# Patient Record
Sex: FEMALE | Race: BLACK OR AFRICAN AMERICAN | NOT HISPANIC OR LATINO | Employment: OTHER | URBAN - METROPOLITAN AREA
[De-identification: names, ages, dates, MRNs, and addresses within clinical notes are randomized per-mention and may not be internally consistent; named-entity substitution may affect disease eponyms.]

---

## 2017-01-04 PROBLEM — N63.0 BREAST LUMP: Status: ACTIVE | Noted: 2017-01-04

## 2017-01-18 ENCOUNTER — TELEPHONE (OUTPATIENT)
Dept: CARDIOLOGY | Facility: CLINIC | Age: 57
End: 2017-01-18

## 2017-01-18 NOTE — TELEPHONE ENCOUNTER
----- Message from Jillian Camarillo sent at 1/18/2017  1:49 PM CST -----  Contact: self  Call placed to pod.  Returning your call.  Please call back at

## 2017-01-18 NOTE — TELEPHONE ENCOUNTER
Attempted to call patient to inform her that her 2 u/s tests have to be done here as Dr Doty has to be able to read them.  No answer, unable to leave msg b/c mailbox was full.

## 2017-01-18 NOTE — TELEPHONE ENCOUNTER
----- Message from Alexandre Zayas sent at 1/17/2017  3:37 PM CST -----  Contact: Self  Requesting 2 tests scheduled at [facility] due to transportation. Wants the orders to be mailed to patient  and faxed to hospital because of complications last time. Please call [phone].Thank you!

## 2017-01-18 NOTE — TELEPHONE ENCOUNTER
Attempted to call patient 2nd time to inform u/s tests need to be done here in [place] and cannot be done at [place] as Dr Doty needs to read the tests and [place] is not an [facility] facility. No answer, unable to leave msg b/c mailbox was full

## 2017-02-20 ENCOUNTER — TELEPHONE (OUTPATIENT)
Dept: CARDIOLOGY | Facility: CLINIC | Age: 57
End: 2017-02-20

## 2017-02-20 NOTE — TELEPHONE ENCOUNTER
----- Message from RT Disha sent at 2/20/2017 10:27 AM CST -----  Contact: pt    Called pod, pt , returned missed call, thanks.

## 2017-02-20 NOTE — TELEPHONE ENCOUNTER
Returned pt's call and confirmed her scheduled testing, lab and Dr Lalitha hannon for her. Patient verbaized understanding.

## 2017-02-24 ENCOUNTER — LAB VISIT (OUTPATIENT)
Dept: LAB | Facility: HOSPITAL | Age: 57
End: 2017-02-24
Attending: INTERNAL MEDICINE
Payer: MEDICARE

## 2017-02-24 ENCOUNTER — CLINICAL SUPPORT (OUTPATIENT)
Dept: CARDIOLOGY | Facility: CLINIC | Age: 57
End: 2017-02-24
Payer: MEDICARE

## 2017-02-24 DIAGNOSIS — I71.40 ABDOMINAL AORTIC ANEURYSM WITHOUT RUPTURE: Chronic | ICD-10-CM

## 2017-02-24 DIAGNOSIS — I71.40 ABDOMINAL AORTIC ANEURYSM WITHOUT RUPTURE: ICD-10-CM

## 2017-02-24 DIAGNOSIS — Z95.5 STENTED CORONARY ARTERY: Chronic | ICD-10-CM

## 2017-02-24 DIAGNOSIS — I10 ESSENTIAL HYPERTENSION: ICD-10-CM

## 2017-02-24 DIAGNOSIS — I25.10 CORONARY ARTERY DISEASE INVOLVING NATIVE CORONARY ARTERY WITHOUT ANGINA PECTORIS, UNSPECIFIED WHETHER NATIVE OR TRANSPLANTED HEART: ICD-10-CM

## 2017-02-24 DIAGNOSIS — I71.40 ABDOMINAL AORTIC ANEURYSM (AAA) WITHOUT RUPTURE: ICD-10-CM

## 2017-02-24 DIAGNOSIS — I34.0 MITRAL REGURGITATION: ICD-10-CM

## 2017-02-24 DIAGNOSIS — I70.1 ATHEROSCLEROTIC RAS (RENAL ARTERY STENOSIS), BILATERAL: Chronic | ICD-10-CM

## 2017-02-24 DIAGNOSIS — I34.0 NONRHEUMATIC MITRAL VALVE INSUFFICIENCY: ICD-10-CM

## 2017-02-24 LAB
ALBUMIN SERPL BCP-MCNC: 3.5 G/DL
ALP SERPL-CCNC: 80 U/L
ALT SERPL W/O P-5'-P-CCNC: 11 U/L
ANION GAP SERPL CALC-SCNC: 8 MMOL/L
AST SERPL-CCNC: 14 U/L
BASOPHILS # BLD AUTO: 0.03 K/UL
BASOPHILS NFR BLD: 0.6 %
BILIRUB SERPL-MCNC: 0.4 MG/DL
BUN SERPL-MCNC: 25 MG/DL
CALCIUM SERPL-MCNC: 9.6 MG/DL
CHLORIDE SERPL-SCNC: 112 MMOL/L
CHOLEST/HDLC SERPL: 7.3 {RATIO}
CO2 SERPL-SCNC: 22 MMOL/L
CREAT SERPL-MCNC: 2 MG/DL
DIFFERENTIAL METHOD: ABNORMAL
EOSINOPHIL # BLD AUTO: 0.1 K/UL
EOSINOPHIL NFR BLD: 2.4 %
ERYTHROCYTE [DISTWIDTH] IN BLOOD BY AUTOMATED COUNT: 14.1 %
EST. GFR  (AFRICAN AMERICAN): 31.5 ML/MIN/1.73 M^2
EST. GFR  (NON AFRICAN AMERICAN): 27.3 ML/MIN/1.73 M^2
GLUCOSE SERPL-MCNC: 100 MG/DL
HCT VFR BLD AUTO: 43.4 %
HDL/CHOLESTEROL RATIO: 13.6 %
HDLC SERPL-MCNC: 257 MG/DL
HDLC SERPL-MCNC: 35 MG/DL
HGB BLD-MCNC: 13.7 G/DL
LDLC SERPL CALC-MCNC: 178.2 MG/DL
LYMPHOCYTES # BLD AUTO: 2 K/UL
LYMPHOCYTES NFR BLD: 36.6 %
MCH RBC QN AUTO: 28 PG
MCHC RBC AUTO-ENTMCNC: 31.6 %
MCV RBC AUTO: 89 FL
MONOCYTES # BLD AUTO: 0.5 K/UL
MONOCYTES NFR BLD: 9.9 %
NEUTROPHILS # BLD AUTO: 2.7 K/UL
NEUTROPHILS NFR BLD: 50.3 %
NONHDLC SERPL-MCNC: 222 MG/DL
PLATELET # BLD AUTO: 250 K/UL
PMV BLD AUTO: 12.9 FL
POTASSIUM SERPL-SCNC: 4.3 MMOL/L
PROT SERPL-MCNC: 7.7 G/DL
RBC # BLD AUTO: 4.89 M/UL
SODIUM SERPL-SCNC: 142 MMOL/L
TRIGL SERPL-MCNC: 219 MG/DL
WBC # BLD AUTO: 5.35 K/UL

## 2017-02-24 PROCEDURE — 36415 COLL VENOUS BLD VENIPUNCTURE: CPT | Mod: PO

## 2017-02-24 PROCEDURE — 93306 TTE W/DOPPLER COMPLETE: CPT | Mod: PBBFAC,PO | Performed by: INTERNAL MEDICINE

## 2017-02-24 PROCEDURE — 80061 LIPID PANEL: CPT

## 2017-02-24 PROCEDURE — 80053 COMPREHEN METABOLIC PANEL: CPT

## 2017-02-24 PROCEDURE — 85025 COMPLETE CBC W/AUTO DIFF WBC: CPT

## 2017-02-24 PROCEDURE — 93978 VASCULAR STUDY: CPT | Mod: PBBFAC,PO | Performed by: INTERNAL MEDICINE

## 2017-02-28 LAB
DIASTOLIC DYSFUNCTION: YES
ESTIMATED PA SYSTOLIC PRESSURE: 32
MITRAL VALVE REGURGITATION: ABNORMAL
RETIRED EF AND QEF - SEE NOTES: 55 (ref 55–65)
TRICUSPID VALVE REGURGITATION: ABNORMAL
VASCULAR ABDOMINAL AORTIC ANEURYSM (AAA): 2.44

## 2017-03-17 ENCOUNTER — TELEPHONE (OUTPATIENT)
Dept: CARDIOLOGY | Facility: CLINIC | Age: 57
End: 2017-03-17

## 2017-03-21 ENCOUNTER — OFFICE VISIT (OUTPATIENT)
Dept: CARDIOLOGY | Facility: CLINIC | Age: 57
End: 2017-03-21
Payer: MEDICARE

## 2017-03-21 VITALS
BODY MASS INDEX: 49.31 KG/M2 | HEART RATE: 71 BPM | HEIGHT: 64 IN | SYSTOLIC BLOOD PRESSURE: 170 MMHG | DIASTOLIC BLOOD PRESSURE: 103 MMHG | WEIGHT: 288.81 LBS

## 2017-03-21 DIAGNOSIS — I25.10 CORONARY ARTERY DISEASE DUE TO LIPID RICH PLAQUE: Chronic | ICD-10-CM

## 2017-03-21 DIAGNOSIS — I70.1 ATHEROSCLEROTIC RAS (RENAL ARTERY STENOSIS), BILATERAL: Primary | Chronic | ICD-10-CM

## 2017-03-21 DIAGNOSIS — I25.83 CORONARY ARTERY DISEASE DUE TO LIPID RICH PLAQUE: Chronic | ICD-10-CM

## 2017-03-21 DIAGNOSIS — I10 HTN (HYPERTENSION), MALIGNANT: Chronic | ICD-10-CM

## 2017-03-21 DIAGNOSIS — Z95.5 STENTED CORONARY ARTERY: Chronic | ICD-10-CM

## 2017-03-21 DIAGNOSIS — N18.3 CKD (CHRONIC KIDNEY DISEASE), STAGE 3 (MODERATE): Chronic | ICD-10-CM

## 2017-03-21 DIAGNOSIS — I71.30 RUPTURED ABDOMINAL AORTIC ANEURYSM (AAA): Chronic | ICD-10-CM

## 2017-03-21 DIAGNOSIS — E78.5 DYSLIPIDEMIA: Chronic | ICD-10-CM

## 2017-03-21 DIAGNOSIS — I34.0 NON-RHEUMATIC MITRAL REGURGITATION: Chronic | ICD-10-CM

## 2017-03-21 PROCEDURE — 99213 OFFICE O/P EST LOW 20 MIN: CPT | Mod: PBBFAC,PO | Performed by: INTERNAL MEDICINE

## 2017-03-21 PROCEDURE — 99214 OFFICE O/P EST MOD 30 MIN: CPT | Mod: S$PBB,,, | Performed by: INTERNAL MEDICINE

## 2017-03-21 PROCEDURE — 99999 PR PBB SHADOW E&M-EST. PATIENT-LVL III: CPT | Mod: PBBFAC,,, | Performed by: INTERNAL MEDICINE

## 2017-03-21 RX ORDER — CLOPIDOGREL BISULFATE 75 MG/1
75 TABLET ORAL DAILY
Qty: 90 TABLET | Refills: 5 | Status: SHIPPED | OUTPATIENT
Start: 2017-03-21 | End: 2017-08-04 | Stop reason: SDUPTHER

## 2017-03-21 RX ORDER — EZETIMIBE 10 MG/1
10 TABLET ORAL NIGHTLY
Qty: 90 TABLET | Refills: 5 | Status: SHIPPED | OUTPATIENT
Start: 2017-03-21 | End: 2017-10-23

## 2017-03-21 RX ORDER — AMLODIPINE BESYLATE 5 MG/1
5 TABLET ORAL NIGHTLY
Qty: 90 TABLET | Refills: 6 | Status: SHIPPED | OUTPATIENT
Start: 2017-03-21 | End: 2017-10-23

## 2017-03-21 RX ORDER — ROSUVASTATIN CALCIUM 10 MG/1
10 TABLET, COATED ORAL NIGHTLY
Qty: 90 TABLET | Refills: 4 | Status: SHIPPED | OUTPATIENT
Start: 2017-03-21 | End: 2017-08-04 | Stop reason: SDUPTHER

## 2017-03-21 RX ORDER — LISINOPRIL 20 MG/1
20 TABLET ORAL EVERY MORNING
Qty: 90 TABLET | Refills: 6 | Status: SHIPPED | OUTPATIENT
Start: 2017-03-21 | End: 2017-08-04 | Stop reason: SDUPTHER

## 2017-03-21 NOTE — PROGRESS NOTES
Subjective:    Patient ID:  Leena Gibbs is a 56 y.o. female who presents for follow-up of No chief complaint on file.      HPI   Ms. Gibbs here for follow up of resistant HTN/small AAA/MR.    Review of Systems   Constitution: Negative for chills, decreased appetite, weakness and night sweats.   HENT: Negative for headaches and nosebleeds.    Eyes: Negative for blurred vision and visual disturbance.   Cardiovascular: Negative for chest pain, claudication, cyanosis, dyspnea on exertion, irregular heartbeat, leg swelling, near-syncope, orthopnea, palpitations, paroxysmal nocturnal dyspnea and syncope.   Respiratory: Negative for cough and shortness of breath.    Endocrine: Negative for polyuria.   Hematologic/Lymphatic: Does not bruise/bleed easily.   Skin: Negative for flushing and rash.   Musculoskeletal: Negative for arthritis, joint pain, muscle cramps and myalgias.   Gastrointestinal: Negative for abdominal pain, change in bowel habit and heartburn.   Genitourinary: Negative for bladder incontinence.   Neurological: Negative for dizziness, light-headedness and loss of balance.   Psychiatric/Behavioral: Negative for altered mental status.        Objective:    Physical Exam   Constitutional: She is oriented to person, place, and time. She appears well-developed and well-nourished.   Neck: Normal range of motion. No JVD present.   Cardiovascular: Normal rate, regular rhythm, normal heart sounds and intact distal pulses.    Pulmonary/Chest: Effort normal and breath sounds normal.   Neurological: She is alert and oriented to person, place, and time.   Skin: Skin is warm and dry.   Psychiatric: She has a normal mood and affect.             ..    Chemistry        Component Value Date/Time     02/24/2017 0903    K 4.3 02/24/2017 0903     (H) 02/24/2017 0903    CO2 22 (L) 02/24/2017 0903    BUN 25 (H) 02/24/2017 0903    CREATININE 2.0 (H) 02/24/2017 0903     02/24/2017 0903         Component Value Date/Time    CALCIUM 9.6 02/24/2017 0903    ALKPHOS 80 02/24/2017 0903    AST 14 02/24/2017 0903    AST 18 11/28/2015 1922    ALT 11 02/24/2017 0903    BILITOT 0.4 02/24/2017 0903            ..  Lab Results   Component Value Date    CHOL 257 (H) 02/24/2017    CHOL 148 01/10/2012    CHOL 165 09/29/2011     Lab Results   Component Value Date    HDL 35 (L) 02/24/2017    HDL 36 (L) 01/10/2012    HDL 32 (L) 09/29/2011     Lab Results   Component Value Date    LDLCALC 178.2 (H) 02/24/2017    LDLCALC 81.0 01/10/2012    LDLCALC 95.2 09/29/2011     Lab Results   Component Value Date    TRIG 219 (H) 02/24/2017    TRIG 154 (H) 01/10/2012    TRIG 189 (H) 09/29/2011     Lab Results   Component Value Date    CHOLHDL 13.6 (L) 02/24/2017    CHOLHDL 24.3 01/10/2012    CHOLHDL 19.4 (L) 09/29/2011     ..  Lab Results   Component Value Date    WBC 5.35 02/24/2017    HGB 13.7 02/24/2017    HCT 43.4 02/24/2017    MCV 89 02/24/2017     02/24/2017       Test(s) Reviewed  I have reviewed the following in detail:  [] Stress test   [x] Angiography   [x] Echocardiogram   [x] Labs   [x] Other:    There is no evidence of an Abdominal Aortic Aneurysm.  Poor images due to bowel gas, and body habitus.  This document has been electronically    SIGNED BY: Maikel Doty MD On: 02/28/2017 14:45     Assessment:         ICD-10-CM ICD-9-CM   1. Atherosclerotic DEJA (renal artery stenosis), bilateral I70.1 440.1   2. Coronary artery disease due to lipid rich plaque I25.10 414.00    I25.83 414.3   3. Stented coronary artery Z95.5 V45.82   4. Dyslipidemia E78.5 272.4   5. CKD (chronic kidney disease), stage 3 (moderate) N18.3 585.3   6. Ruptured abdominal aortic aneurysm (AAA) I71.3 441.3   7. Non-rheumatic mitral regurgitation I34.0 424.0   8. HTN (hypertension), malignant I10 401.0     Problem List Items Addressed This Visit     AAA (abdominal aortic aneurysm) (Chronic)    Overview     1/2014  Findings:  Real-time, color flow and  Doppler imaging of the aorta was performed.  Atherosclerotic plaque identified within the distal aorta and proximal common iliac arteries. There is mild saccular aneurysmal dilatation/ focal ectasia of the distal abdominal aorta.  Proximal aorta: 3.1 cm AP x 2.9 cm TRV.   Mid aorta: 2.0 cm x 2.3 cm.  Distal aorta: 3.4 cm x 2.6 cm. (3.0 cm in length)  Proximal common iliac arteries measured 1.3 cm and 1.1 cm maximum diameter on the right and left respectively.      Result Impression    1. Atherosclerotic plaque and mild ectasia/saccular aneurysmal dilatation of the distal abd aorta.  2. No other cystic findings.  3. See discussion above. Further evaluation and/or follow-up imaging as warranted.   Electronically signed by: MARRY MARCUS III, MD       1/12 3.3 cm         Atherosclerotic DEJA (renal artery stenosis), bilateral - Primary (Chronic)    Overview     2/2015 Rt renal- 40%, Lt renal - 30%         CAD (coronary artery disease) (Chronic)    Overview     2/2015 LAD- 50% mid and distal, Cx- 20%, OM2 80%, smll branch 60%, RCA- non-dom 30%         CKD (chronic kidney disease) (Chronic)    Overview     Dr Beatty         Dyslipidemia (Chronic)    HTN (hypertension), malignant (Chronic)    Overview     11/15/2010 bilateral Renal, 10% stenosis         Mitral regurgitation (Chronic)    Overview     17/13 mod/sev         Stented coronary artery (Chronic)    Overview     2/2015 OM2- promus 2.75x30                Plan:           Return to clinic 1 year   Aerobic exercise 5x's/wk. Heart healthy diet and risk factor modification.    See labs and med orders.  Change simva to rousova. Change lisinopril

## 2017-03-21 NOTE — MR AVS SNAPSHOT
[facility]  [address] [facility] [address]  [facility] [address]  Phone: [phone]                  Leena Pardofield   3/21/2017 1:20 PM   Office Visit    Description:  Female : 1960   Provider:  Maikel Doty MD   Department:  [facility]           Reason for Visit     Hypertension     Coronary Artery Disease           Diagnoses this Visit        Comments    Atherosclerotic DEJA (renal artery stenosis), bilateral    -  Primary     Coronary artery disease due to lipid rich plaque         Stented coronary artery         Dyslipidemia         CKD (chronic kidney disease), stage 3 (moderate)         Ruptured abdominal aortic aneurysm (AAA)         Non-rheumatic mitral regurgitation         HTN (hypertension), malignant                To Do List           Goals (5 Years of Data)     None      Follow-Up and Disposition     Return in about 1 year (around 3/21/2018).       These Medications        Disp Refills Start End    ezetimibe (ZETIA) 10 mg tablet 90 tablet 5 3/21/2017     Take 1 tablet (10 mg total) by mouth every evening. At bedtime - Oral    Pharmacy: [facility] - [address] Ph #: [phone]       clopidogrel (PLAVIX) 75 mg tablet 90 tablet 5 3/21/2017     Take 1 tablet (75 mg total) by mouth once daily. - Oral    Pharmacy: [facility] - [address] Ph #: [phone]       amlodipine (NORVASC) 5 MG tablet 90 tablet 6 3/21/2017     Take 1 tablet (5 mg total) by mouth every evening. - Oral    Pharmacy: [facility] - [address] Ph #: [phone]       lisinopril (PRINIVIL,ZESTRIL) 20 MG tablet 90 tablet 6 3/21/2017 3/21/2018    Take 1 tablet (20 mg total) by mouth every morning. - Oral    Pharmacy: [facility] - [address] Ph #: [phone]       rosuvastatin (CRESTOR) 10 MG tablet 90 tablet  4 3/21/2017 3/21/2018    Take 1 tablet (10 mg total) by mouth every evening. - Oral    Pharmacy: [facility] - [place] [address] #: [phone]         [facility] On Call     [facility] On Call Nurse Care Line - 24/7 Assistance  Registered nurses in the [facility] On Call Center provide clinical advisement, health education, appointment booking, and other advisory services.  Call for this free service at [phone].             Medications           Message regarding Medications     Verify the changes and/or additions to your medication regime listed below are the same as discussed with your clinician today.  If any of these changes or additions are incorrect, please notify your healthcare provider.        START taking these NEW medications        Refills    lisinopril (PRINIVIL,ZESTRIL) 20 MG tablet 6    Sig: Take 1 tablet (20 mg total) by mouth every morning.    Class: Normal    Route: Oral    rosuvastatin (CRESTOR) 10 MG tablet 4    Sig: Take 1 tablet (10 mg total) by mouth every evening.    Class: Normal    Route: Oral      STOP taking these medications     simvastatin (ZOCOR) 80 MG tablet Take 80 mg by mouth nightly. At bedtime    ramipril (ALTACE) 5 MG capsule Take 1 capsule (5 mg total) by mouth 2 (two) times daily.           Verify that the below list of medications is an accurate representation of the medications you are currently taking.  If none reported, the list may be blank. If incorrect, please contact your healthcare provider. Carry this list with you in case of emergency.           Current Medications     acetaminophen (TYLENOL) 325 MG tablet Take 1 tablet (325 mg total) by mouth every 6 (six) hours as needed for Pain.    amlodipine (NORVASC) 5 MG tablet Take 1 tablet (5 mg total) by mouth every evening.    calcitRIOL (ROCALTROL) 0.5 MCG Cap Take 0.5 mcg by mouth once daily.    carvedilol (COREG) 25 MG tablet Take 1 tablet (25 mg total) by mouth 2 (two) times  "daily with meals.    cloNIDine (CATAPRES) 0.1 MG tablet Take 0.1 mg by mouth as needed (as needed for diastolic pressure greater than 100).     clopidogrel (PLAVIX) 75 mg tablet Take 1 tablet (75 mg total) by mouth once daily.    ergocalciferol (VITAMIN D2) 50,000 unit Cap Take 50,000 Units by mouth every Monday.    ezetimibe (ZETIA) 10 mg tablet Take 1 tablet (10 mg total) by mouth every evening. At bedtime    febuxostat (ULORIC) 80 mg Tab Take 80 mg by mouth once daily.     hydrocodone-acetaminophen 10-325mg (NORCO)  mg Tab Take 1 tablet by mouth after meals as needed.    isosorbide mononitrate (IMDUR) 60 MG 24 hr tablet Take 1 tablet (60 mg total) by mouth once daily.    LANTUS SOLOSTAR 100 unit/mL (3 mL) InPn pen Inject 5 Units as directed every evening.     prednisoLONE acetate (PRED FORTE) 1 % DrpS Place 1 drop into the left eye once daily.    triamterene-hydrochlorothiazide 37.5-25 mg (MAXZIDE-25) 37.5-25 mg per tablet Take 1 tablet by mouth once daily.    lisinopril (PRINIVIL,ZESTRIL) 20 MG tablet Take 1 tablet (20 mg total) by mouth every morning.    rosuvastatin (CRESTOR) 10 MG tablet Take 1 tablet (10 mg total) by mouth every evening.           Clinical Reference Information           Your Vitals Were     BP Pulse Height Weight BMI    170/103 (BP Location: Left arm, Patient Position: Sitting, BP Method: Automatic) 71 5' 4" (1.626 m) 131 kg (288 lb 12.8 oz) 49.57 kg/m2      Blood Pressure          Most Recent Value    BP  (!)  170/103      Allergies as of 3/21/2017     Sulfa (Sulfonamide Antibiotics)      Immunizations Administered on Date of Encounter - 3/21/2017     None      Orders Placed During Today's Visit     Future Labs/Procedures Expected by Expires    CAR US Abdominal Aorta  3/21/2018 3/21/2018    Comprehensive metabolic panel  3/21/2018 3/22/2018    Lipid panel  3/21/2018 3/22/2018      [facility] Sign-Up     Activating your [facility] account is as easy as 1-2-3!     1) Visit " [email or web address], select Sign Up Now, enter this activation code and your date of birth, then select Next.  [email or web address]  Expires: 5/5/2017  1:49 PM      2) Create a username and password to use when you visit [facility] in the future and select a security question in case you lose your password and select Next.    3) Enter your e-mail address and click Sign Up!    Additional Information  If you have questions, please e-mail [email or web address] or call [phone] to talk to our [email or web address] staff. Remember, [facility] is NOT to be used for urgent needs. For medical emergencies, dial 911.         Language Assistance Services     ATTENTION: Language assistance services are available, free of charge. Please call [phone].      ATENCIÓN: Si habla español, tiene a duncan disposición servicios gratuitos de asistencia lingüística. Llame al [phone].     CHÚ Ý: N?u b?n nói Ti?ng Vi?t, có các d?ch v? h? tr? ngôn ng? mi?n phí dành cho b?n. G?i s? [phone].         [facility] complies with applicable Federal civil rights laws and does not discriminate on the basis of race, color, national origin, age, disability, or sex.

## 2017-06-26 ENCOUNTER — TELEPHONE (OUTPATIENT)
Dept: CARDIOLOGY | Facility: CLINIC | Age: 57
End: 2017-06-26

## 2017-06-26 NOTE — TELEPHONE ENCOUNTER
Pt out of town and does not have enough medication to cover her until she returns on 7/12.  Called in Clonidine 0.1 mg - take one tablet as needed for Diastolic blood pressure below 100. Disp # 32, Refills: 0. Pt checked bp 2x's daily.

## 2017-06-26 NOTE — TELEPHONE ENCOUNTER
----- Message from Flores Moscoso sent at 6/26/2017  9:29 AM CDT -----  Patient is out of state and needs some medications faxed to her. She needs to talk to office about this first because she says that the insurance company is not going to refill them. Please call for more details from patient at [phone].

## 2017-08-03 ENCOUNTER — TELEPHONE (OUTPATIENT)
Dept: CARDIOLOGY | Facility: CLINIC | Age: 57
End: 2017-08-03

## 2017-08-03 NOTE — TELEPHONE ENCOUNTER
Returned pt's call.  Pt is requesting the following refills: Clonidine, Carvedilol, Plavix, Imdur, Lisinopril and Maxzide.  Pt requesting meds to be called into [facility] [phone] NEISHA Eid.  Pt states that Crestor is not covered by insurance and would like to know if she can re-start Zetia.  I informed pt that Dr. Doty is on hospital call but we will send him a message and call her back once we get a response from him. Pt understood.

## 2017-08-04 DIAGNOSIS — I25.83 CORONARY ARTERY DISEASE DUE TO LIPID RICH PLAQUE: Chronic | ICD-10-CM

## 2017-08-04 DIAGNOSIS — I25.10 CORONARY ARTERY DISEASE INVOLVING NATIVE CORONARY ARTERY OF NATIVE HEART WITHOUT ANGINA PECTORIS: Chronic | ICD-10-CM

## 2017-08-04 DIAGNOSIS — I71.30 RUPTURED ABDOMINAL AORTIC ANEURYSM (AAA): Chronic | ICD-10-CM

## 2017-08-04 DIAGNOSIS — I25.10 CORONARY ARTERY DISEASE DUE TO LIPID RICH PLAQUE: Chronic | ICD-10-CM

## 2017-08-04 DIAGNOSIS — I70.1 ATHEROSCLEROTIC RAS (RENAL ARTERY STENOSIS), BILATERAL: Chronic | ICD-10-CM

## 2017-08-04 DIAGNOSIS — E78.5 DYSLIPIDEMIA: Chronic | ICD-10-CM

## 2017-08-04 DIAGNOSIS — I34.0 MITRAL VALVE INSUFFICIENCY, UNSPECIFIED ETIOLOGY: ICD-10-CM

## 2017-08-04 DIAGNOSIS — I10 HTN (HYPERTENSION), MALIGNANT: Chronic | ICD-10-CM

## 2017-08-04 DIAGNOSIS — Z95.5 STENTED CORONARY ARTERY: Chronic | ICD-10-CM

## 2017-08-04 DIAGNOSIS — I34.0 NON-RHEUMATIC MITRAL REGURGITATION: Chronic | ICD-10-CM

## 2017-08-04 DIAGNOSIS — I71.40 ABDOMINAL AORTIC ANEURYSM WITHOUT RUPTURE: Chronic | ICD-10-CM

## 2017-08-04 NOTE — TELEPHONE ENCOUNTER
----- Message from Shonda Aguilera sent at 8/4/2017  3:31 PM CDT -----  Contact: [facility] - [address] [address]  Phone: [phone] Fax: [phone]

## 2017-08-05 RX ORDER — LISINOPRIL 20 MG/1
20 TABLET ORAL EVERY MORNING
Qty: 90 TABLET | Refills: 6 | Status: SHIPPED | OUTPATIENT
Start: 2017-08-05 | End: 2018-10-08 | Stop reason: SDUPTHER

## 2017-08-05 RX ORDER — TRIAMTERENE/HYDROCHLOROTHIAZID 37.5-25 MG
1 TABLET ORAL DAILY
Qty: 90 TABLET | Refills: 6 | Status: SHIPPED | OUTPATIENT
Start: 2017-08-05 | End: 2019-03-07 | Stop reason: SDUPTHER

## 2017-08-05 RX ORDER — ISOSORBIDE MONONITRATE 60 MG/1
60 TABLET, EXTENDED RELEASE ORAL DAILY
Qty: 90 TABLET | Refills: 3 | Status: SHIPPED | OUTPATIENT
Start: 2017-08-05 | End: 2019-01-23 | Stop reason: SDUPTHER

## 2017-08-05 RX ORDER — CARVEDILOL 25 MG/1
25 TABLET ORAL 2 TIMES DAILY WITH MEALS
Qty: 180 TABLET | Refills: 4 | Status: SHIPPED | OUTPATIENT
Start: 2017-08-05 | End: 2019-03-19 | Stop reason: SDUPTHER

## 2017-08-05 RX ORDER — ROSUVASTATIN CALCIUM 10 MG/1
10 TABLET, COATED ORAL NIGHTLY
Qty: 90 TABLET | Refills: 4 | Status: SHIPPED | OUTPATIENT
Start: 2017-08-05 | End: 2018-10-08 | Stop reason: SDUPTHER

## 2017-08-05 RX ORDER — CLOPIDOGREL BISULFATE 75 MG/1
75 TABLET ORAL DAILY
Qty: 90 TABLET | Refills: 5 | Status: SHIPPED | OUTPATIENT
Start: 2017-08-05 | End: 2019-03-19 | Stop reason: SDUPTHER

## 2017-08-05 RX ORDER — CLONIDINE HYDROCHLORIDE 0.1 MG/1
0.1 TABLET ORAL
Qty: 90 TABLET | Refills: 5 | Status: SHIPPED | OUTPATIENT
Start: 2017-08-05 | End: 2018-10-12 | Stop reason: SDUPTHER

## 2017-08-07 ENCOUNTER — TELEPHONE (OUTPATIENT)
Dept: CARDIOLOGY | Facility: CLINIC | Age: 57
End: 2017-08-07

## 2017-08-07 NOTE — TELEPHONE ENCOUNTER
----- Message from Shonda Aguilera sent at 8/7/2017  8:55 AM CDT -----  Contact: pt  Pt states need to speak to the nurse,please regarding prescription was supposed to be called in,3 prescriptions were not there,and she needs her medicine this morning.Pharmacy have already left a message on Friday concerning this...[phone] (home)           .  [facility] - [address] [address]  Phone: [phone] Fax: [phone]

## 2017-10-23 ENCOUNTER — OFFICE VISIT (OUTPATIENT)
Dept: UROLOGY | Facility: CLINIC | Age: 57
End: 2017-10-23
Payer: MEDICARE

## 2017-10-23 ENCOUNTER — OFFICE VISIT (OUTPATIENT)
Dept: GASTROENTEROLOGY | Facility: CLINIC | Age: 57
End: 2017-10-23
Payer: MEDICARE

## 2017-10-23 VITALS
SYSTOLIC BLOOD PRESSURE: 122 MMHG | BODY MASS INDEX: 48.1 KG/M2 | HEIGHT: 64 IN | HEART RATE: 63 BPM | WEIGHT: 281.75 LBS | DIASTOLIC BLOOD PRESSURE: 82 MMHG

## 2017-10-23 VITALS
HEART RATE: 61 BPM | BODY MASS INDEX: 49.79 KG/M2 | WEIGHT: 281 LBS | DIASTOLIC BLOOD PRESSURE: 76 MMHG | HEIGHT: 63 IN | SYSTOLIC BLOOD PRESSURE: 118 MMHG

## 2017-10-23 DIAGNOSIS — R31.29 MICROHEMATURIA: ICD-10-CM

## 2017-10-23 DIAGNOSIS — Z80.0 FAMILY HISTORY OF COLON CANCER: ICD-10-CM

## 2017-10-23 DIAGNOSIS — Z87.19 HISTORY OF DIVERTICULOSIS: Primary | ICD-10-CM

## 2017-10-23 DIAGNOSIS — Z86.010 HISTORY OF COLON POLYPS: ICD-10-CM

## 2017-10-23 DIAGNOSIS — R35.0 FREQUENCY OF MICTURITION: Primary | ICD-10-CM

## 2017-10-23 DIAGNOSIS — N39.41 URGE INCONTINENCE: ICD-10-CM

## 2017-10-23 DIAGNOSIS — R39.15 URINARY URGENCY: ICD-10-CM

## 2017-10-23 LAB
BILIRUB SERPL-MCNC: ABNORMAL MG/DL
BLOOD URINE, POC: ABNORMAL
COLOR, POC UA: YELLOW
GLUCOSE UR QL STRIP: ABNORMAL
KETONES UR QL STRIP: ABNORMAL
LEUKOCYTE ESTERASE URINE, POC: ABNORMAL
NITRITE, POC UA: ABNORMAL
PH, POC UA: 5
PROTEIN, POC: ABNORMAL
SPECIFIC GRAVITY, POC UA: 1.02
UROBILINOGEN, POC UA: ABNORMAL

## 2017-10-23 PROCEDURE — 99999 PR PBB SHADOW E&M-EST. PATIENT-LVL III: CPT | Mod: PBBFAC,,, | Performed by: UROLOGY

## 2017-10-23 PROCEDURE — 99215 OFFICE O/P EST HI 40 MIN: CPT | Mod: PBBFAC,27,PO | Performed by: NURSE PRACTITIONER

## 2017-10-23 PROCEDURE — 99999 PR PBB SHADOW E&M-EST. PATIENT-LVL V: CPT | Mod: PBBFAC,,, | Performed by: NURSE PRACTITIONER

## 2017-10-23 PROCEDURE — 81002 URINALYSIS NONAUTO W/O SCOPE: CPT | Mod: PBBFAC,PO | Performed by: UROLOGY

## 2017-10-23 PROCEDURE — 99203 OFFICE O/P NEW LOW 30 MIN: CPT | Mod: S$PBB,,, | Performed by: NURSE PRACTITIONER

## 2017-10-23 PROCEDURE — 87086 URINE CULTURE/COLONY COUNT: CPT

## 2017-10-23 PROCEDURE — 99204 OFFICE O/P NEW MOD 45 MIN: CPT | Mod: S$PBB,,, | Performed by: UROLOGY

## 2017-10-23 PROCEDURE — 99213 OFFICE O/P EST LOW 20 MIN: CPT | Mod: PBBFAC,PO | Performed by: UROLOGY

## 2017-10-23 RX ORDER — HYDROCODONE BITARTRATE 30 MG/1
TABLET, EXTENDED RELEASE ORAL
COMMUNITY
Start: 2017-08-15 | End: 2017-10-23

## 2017-10-23 RX ORDER — METHYLPREDNISOLONE 4 MG/1
TABLET ORAL
COMMUNITY
Start: 2017-09-21 | End: 2017-10-23

## 2017-10-23 RX ORDER — HYDROCODONE BITARTRATE AND ACETAMINOPHEN 10; 325 MG/1; MG/1
1 TABLET ORAL
COMMUNITY
End: 2017-10-23 | Stop reason: SDUPTHER

## 2017-10-23 RX ORDER — AMLODIPINE BESYLATE 10 MG/1
10 TABLET ORAL NIGHTLY
COMMUNITY
Start: 2017-10-02 | End: 2019-03-19 | Stop reason: SDUPTHER

## 2017-10-23 RX ORDER — ERGOCALCIFEROL 1.25 MG/1
50000 CAPSULE ORAL
COMMUNITY
End: 2021-07-29 | Stop reason: SDUPTHER

## 2017-10-23 RX ORDER — CIPROFLOXACIN 500 MG/1
TABLET ORAL
COMMUNITY
Start: 2017-10-02 | End: 2017-10-23

## 2017-10-23 NOTE — PATIENT INSTRUCTIONS
Understanding Diverticulosis and Diverticulitis     Pouches or diverticula usually occur in the lower part of the colon called the sigmoid.     The colon (large intestine) is the last part of the digestive tract. It absorbs water from stool and changes it from a liquid to a solid. In certain cases, small pouches called diverticula can form in the colon wall. This condition is called diverticulosis. The pouches can become infected. If this happens, it becomes a more serious problem called diverticulitis. These problems can be painful. But they can be managed.  Managing your condition  Diet changes or medicines may be prescribed.   If you have diverticulosis  Recommendations include:  · Diet changes are often enough to control symptoms. The main changes are adding fiber (roughage) and drinking more water. Fiber absorbs water as it travels through your colon. This helps your stool stay soft and move smoothly. Water helps this process.  · If needed, you may be told to take over-the-counter stool softeners.  · To help relieve pain, antispasmodic medicines may be prescribed.  · Watch for changes in your bowel movements. Tell the healthcare provider if you notice any changes.  · Begin an exercise program. Ask your healthcare provider how to get started.  · Get plenty of rest and sleep.   If you have diverticulitis  Treatment depends on how bad your symptoms are.  · For mild symptoms. You may be put on a liquid diet for a short time. Antibiotics are usually prescribed. If these two steps relieve your symptoms, you may then be prescribed a high-fiber diet. If you still have symptoms, your healthcare provider will discuss more treatment choices with you.  · For severe symptoms. You may need to be admitted to the hospital. There, you can be given IV antibiotics and fluids. You will also be put on a low-fiber or liquid diet. Although not common, surgery is needed in some people with severe symptoms.  [place] to colon health      Diverticulitis occurs when the pouches become infected or inflamed.     Help keep your colon healthy with a diet that includes plenty of high-fiber fruits, vegetables, and whole grains. Drink plenty of liquids like water and juice. Maintain a healthy lifestyle including regular exercise, stress management, and adequate rest and sleep.   Date Last Reviewed: 7/1/2016  © 7783-2008 [employer]. [address] [place] [address], [place] [address]. All rights reserved. This information is not intended as a substitute for professional medical care. Always follow your healthcare professional's instructions.

## 2017-10-23 NOTE — PROGRESS NOTES
Subjective:       Patient ID: Leena Gibbs is a 57 y.o. female.    Chief Complaint: Urinary Frequency    HPI     57 year old with severe urinary frequency and urgency.  She also has urgency incontinence.  She is wearing pads.  Her problems started after stroke in 2006 and have been getting worse.  She has been seen in the past by Dr. Hand.  She had a procedure but details unknown.  She has tried previous medications for this problem.  She cannot remember names but 3 different types.   She has long term diabetes.  She was recently treated for a UTI.  She denies gross hematuria and dysuria.  She has never smoked.    Urine dipstick shows positive for 2+blood.  Micro exam: 3-5 RBC's per HPF.      Past Medical History:   Diagnosis Date    AAA (abdominal aortic aneurysm)     Anticoagulant long-term use     Blindness of left eye     Colon polyp     Coronary artery disease     CVA (cerebral infarction)     x2; 12/27/2006 & 1/18/2010    Diverticulosis     DM (diabetes mellitus)     Dyspnea on exertion     Gout     Hyperlipidemia     Hypertension     Kidney disease     stage 3, sees nephrologist Dr Baetty    Mitral valve regurgitation     PONV (postoperative nausea and vomiting)     Renal failure     stage 3, per patient.  Sees Dr Dorothy Beatty    Sleep apnea with use of continuous positive airway pressure (CPAP)     Stroke      Past Surgical History:   Procedure Laterality Date    BREAST BIOPSY      benign    CARDIAC CATHETERIZATION  02/2015    with 1 stent    CARDIAC CATHETERIZATION  05/2016    CHOLECYSTECTOMY      COLONOSCOPY  11/04/2011    Dr. Post, [place]: colon polyps removed, reduntant colon; repeat in 3 years; biopsy: 1. TUBULAR ADENOMA. & 2. tubulovillous adenoma    CORNEAL TRANSPLANT Left     twice    EYE SURGERY Left     cornea transplant twice    KNEE SURGERY      Right    TOTAL ABDOMINAL HYSTERECTOMY         Current Outpatient Prescriptions:     acetaminophen  (TYLENOL) 325 MG tablet, Take 1 tablet (325 mg total) by mouth every 6 (six) hours as needed for Pain., Disp: , Rfl:     amlodipine (NORVASC) 10 MG tablet, Take 10 mg by mouth every evening. , Disp: , Rfl:     calcitRIOL (ROCALTROL) 0.5 MCG Cap, Take by mouth once daily. 1 mg daily, Disp: , Rfl:     carvedilol (COREG) 25 MG tablet, Take 1 tablet (25 mg total) by mouth 2 (two) times daily with meals., Disp: 180 tablet, Rfl: 4    cloNIDine (CATAPRES) 0.1 MG tablet, Take 1 tablet (0.1 mg total) by mouth as needed (as needed for diastolic pressure greater than 100)., Disp: 90 tablet, Rfl: 5    clopidogrel (PLAVIX) 75 mg tablet, Take 1 tablet (75 mg total) by mouth once daily. (Patient taking differently: Take 75 mg by mouth every Mon, Wed, Fri. ), Disp: 90 tablet, Rfl: 5    ergocalciferol (ERGOCALCIFEROL) 50,000 unit Cap, Take 50,000 Units by mouth every 7 days. , Disp: , Rfl:     febuxostat (ULORIC) 80 mg Tab, Take 80 mg by mouth once daily. , Disp: , Rfl:     hydrocodone-acetaminophen 10-325mg (NORCO)  mg Tab, Take 1 tablet by mouth after meals as needed., Disp: 45 tablet, Rfl: 0    isosorbide mononitrate (IMDUR) 60 MG 24 hr tablet, Take 1 tablet (60 mg total) by mouth once daily., Disp: 90 tablet, Rfl: 3    LANTUS SOLOSTAR 100 unit/mL (3 mL) InPn pen, Inject 5 Units as directed every evening. , Disp: , Rfl:     lisinopril (PRINIVIL,ZESTRIL) 20 MG tablet, Take 1 tablet (20 mg total) by mouth every morning., Disp: 90 tablet, Rfl: 6    prednisoLONE acetate (PRED FORTE) 1 % DrpS, Place 1 drop into the left eye once daily., Disp: , Rfl:     rosuvastatin (CRESTOR) 10 MG tablet, Take 1 tablet (10 mg total) by mouth every evening., Disp: 90 tablet, Rfl: 4    triamterene-hydrochlorothiazide 37.5-25 mg (MAXZIDE-25) 37.5-25 mg per tablet, Take 1 tablet by mouth once daily., Disp: 90 tablet, Rfl: 6      Review of Systems   Constitutional: Negative for fever.   Eyes: Negative for visual disturbance.    Respiratory: Negative for shortness of breath.    Cardiovascular: Negative for chest pain.   Gastrointestinal: Negative for abdominal pain and nausea.   Genitourinary: Positive for frequency and urgency. Negative for dysuria, flank pain and hematuria.   Musculoskeletal: Negative for gait problem.   Skin: Negative for rash.   Neurological: Negative for seizures.   Psychiatric/Behavioral: Negative for confusion.       Objective:      Physical Exam   Constitutional: She is oriented to person, place, and time. She appears well-developed and well-nourished.   Morbid obesity   HENT:   Head: Normocephalic.   Eyes: Conjunctivae and EOM are normal.   Neck: Normal range of motion.   Cardiovascular: Normal rate.    Pulmonary/Chest: Effort normal.   Abdominal: Soft. She exhibits no distension and no mass. There is no tenderness.   Genitourinary:   Genitourinary Comments: Bladder non-tender and nondistended  No CVA tenderness   Musculoskeletal: She exhibits edema (2+).   Neurological: She is alert and oriented to person, place, and time.   Skin: Skin is warm and dry. No rash noted. No erythema.   Psychiatric: She has a normal mood and affect. Her behavior is normal.   Vitals reviewed.      Assessment:       1. Frequency of micturition    2. Microhematuria    3. Urinary urgency    4. Urge incontinence        Plan:       Frequency of micturition  -     POCT URINE DIPSTICK WITHOUT MICROSCOPE  -     Cystoscopy; Future    Microhematuria  -     POCT URINE DIPSTICK WITHOUT MICROSCOPE  -     Cystoscopy; Future  -     Urine culture    Urinary urgency    Urge incontinence      Trial Myrbetriq 50mg,  F/u for cystoscopy.

## 2017-10-25 ENCOUNTER — TELEPHONE (OUTPATIENT)
Dept: GASTROENTEROLOGY | Facility: CLINIC | Age: 57
End: 2017-10-25

## 2017-10-25 LAB — BACTERIA UR CULT: NO GROWTH

## 2017-10-25 NOTE — TELEPHONE ENCOUNTER
This pt is having a colonoscopy 12/13/17.  Can she hold her Plavix for 3-5 days prior to procedure?

## 2017-11-07 ENCOUNTER — TELEPHONE (OUTPATIENT)
Dept: GASTROENTEROLOGY | Facility: CLINIC | Age: 57
End: 2017-11-07

## 2017-11-07 NOTE — TELEPHONE ENCOUNTER
----- Message from Flores Moscoso sent at 11/7/2017 10:04 AM CST -----  Patient has an upcoming procedure on 12/13/17 that she needs to reschedule. Please call back with availability at [phone].

## 2017-12-07 ENCOUNTER — TELEPHONE (OUTPATIENT)
Dept: UROLOGY | Facility: CLINIC | Age: 57
End: 2017-12-07

## 2017-12-07 NOTE — TELEPHONE ENCOUNTER
----- Message from Davina Lowe sent at 12/7/2017  8:42 AM CST -----  Contact: Jenniferreilly  Patient is asking for procedure/appointment to be moved to next Friday due to anticipated weather tomorrow. Please call [phone]. Thanks!

## 2017-12-14 ENCOUNTER — TELEPHONE (OUTPATIENT)
Dept: GASTROENTEROLOGY | Facility: CLINIC | Age: 57
End: 2017-12-14

## 2017-12-14 ENCOUNTER — TELEPHONE (OUTPATIENT)
Dept: CARDIOLOGY | Facility: CLINIC | Age: 57
End: 2017-12-14

## 2017-12-14 NOTE — TELEPHONE ENCOUNTER
----- Message from Silvia Hogue sent at 12/14/2017  1:33 PM CST -----  Contact: self  Patient states she is due for her test. Patient needs orders. Please call patient at [phone]. Thanks!

## 2017-12-14 NOTE — TELEPHONE ENCOUNTER
----- Message from Silvia Hogue sent at 12/14/2017  1:37 PM CST -----  Contact: self  Patient needs to reschedule colonoscopy til after the new year. Please call patient at [phone]. Thanks!

## 2017-12-15 ENCOUNTER — TELEPHONE (OUTPATIENT)
Dept: CARDIOLOGY | Facility: CLINIC | Age: 57
End: 2017-12-15

## 2017-12-15 DIAGNOSIS — I10 HTN (HYPERTENSION), MALIGNANT: Primary | ICD-10-CM

## 2017-12-15 NOTE — TELEPHONE ENCOUNTER
----- Message from Doris Boykin sent at 12/15/2017 10:01 AM CST -----  Contact: Patient  Leena, patient [phone] returning call to office. Transferred to POD. Thanks!

## 2017-12-15 NOTE — TELEPHONE ENCOUNTER
Spoke to patient, informed her of no CFD needed, to just get labs done as per  patient verbalized understanding.

## 2018-01-12 ENCOUNTER — PROCEDURE VISIT (OUTPATIENT)
Dept: UROLOGY | Facility: CLINIC | Age: 58
End: 2018-01-12
Payer: MEDICARE

## 2018-01-12 VITALS
DIASTOLIC BLOOD PRESSURE: 80 MMHG | WEIGHT: 280 LBS | BODY MASS INDEX: 49.6 KG/M2 | HEART RATE: 78 BPM | SYSTOLIC BLOOD PRESSURE: 117 MMHG

## 2018-01-12 DIAGNOSIS — R35.0 FREQUENCY OF MICTURITION: ICD-10-CM

## 2018-01-12 DIAGNOSIS — R31.29 MICROHEMATURIA: ICD-10-CM

## 2018-01-12 LAB
BILIRUB SERPL-MCNC: ABNORMAL MG/DL
BLOOD URINE, POC: ABNORMAL
COLOR, POC UA: YELLOW
GLUCOSE UR QL STRIP: ABNORMAL
KETONES UR QL STRIP: ABNORMAL
LEUKOCYTE ESTERASE URINE, POC: ABNORMAL
NITRITE, POC UA: ABNORMAL
PH, POC UA: 5.5
PROTEIN, POC: ABNORMAL
SPECIFIC GRAVITY, POC UA: 1.02
UROBILINOGEN, POC UA: ABNORMAL

## 2018-01-12 PROCEDURE — 81002 URINALYSIS NONAUTO W/O SCOPE: CPT | Mod: PBBFAC,PO | Performed by: UROLOGY

## 2018-01-12 PROCEDURE — 52000 CYSTOURETHROSCOPY: CPT | Mod: PBBFAC,PO | Performed by: UROLOGY

## 2018-01-12 RX ORDER — METHYLPREDNISOLONE 4 MG/1
TABLET ORAL
COMMUNITY
Start: 2018-01-09 | End: 2018-05-11 | Stop reason: ALTCHOICE

## 2018-01-12 RX ORDER — PEN NEEDLE, DIABETIC 32GX 5/32"
NEEDLE, DISPOSABLE MISCELLANEOUS
COMMUNITY
Start: 2017-11-07

## 2018-01-12 RX ORDER — CIPROFLOXACIN 500 MG/1
TABLET ORAL
COMMUNITY
Start: 2018-01-09 | End: 2018-05-11 | Stop reason: ALTCHOICE

## 2018-01-12 NOTE — PROCEDURES
"Cystoscopy  Date/Time: 1/12/2018 4:16 PM  Performed by: BIJAN GÓMEZ  Authorized by: BIJAN GÓMEZ     Consent Done?:  Yes (Written)  Time out: Immediately prior to procedure a "time out" was called to verify the correct patient, procedure, equipment, support staff and site/side marked as required.    Indications: hematuria    Position:  Supine  Preparation: Patient was prepped and draped in usual sterile fashion      Scope type:  Flexible cystoscope    Patient tolerance:  Patient tolerated the procedure well with no immediate complications    Blood Loss:  None    The patients clinic history and physical were reviewed and there are no changes.    The patient was placed in the frog-leg position.  The genitals were prepped and draped in a sterile fashion.  Viscous lidocaine jelly was intsilled into the urethra.  The urethra was dilated with sounds to 22 Turkish.  Using a flexible scope, a careful cystoscopic exam was then performed.  The entire bladder mucosa was systematically visualized.  The mucosa appeared normal.  There were no lesions, masses foreign bodies or stones.   Each ureteral orifices were visualized and both had clear efflux of urine.   The cystoscope was then removed and I examined the entire length of the urethra.  The urethra appeared normal.  She tolerated the procedure well.  There were no complications.    Impression:  Normal cystoscopy.    Plan Continue Myrbetriq.  RTC 3 months      "

## 2018-01-25 ENCOUNTER — TELEPHONE (OUTPATIENT)
Dept: GASTROENTEROLOGY | Facility: CLINIC | Age: 58
End: 2018-01-25

## 2018-01-25 NOTE — TELEPHONE ENCOUNTER
----- Message from Flores Moscoso sent at 1/25/2018  2:15 PM CST -----  Patient needs to talk to office. She might have to reschedule her procedure on 2/8/18. Please call for details at [phone].

## 2018-01-25 NOTE — TELEPHONE ENCOUNTER
----- Message from Ronnie Gonzalez sent at 1/25/2018  3:39 PM CST -----  Contact: same  Patient called in and stated someone may have called her but she doesn't know cause she does not know how to check messages on her phone??  Patient stated if someone called her please call back at [phone]

## 2018-02-02 ENCOUNTER — TELEPHONE (OUTPATIENT)
Dept: UROLOGY | Facility: CLINIC | Age: 58
End: 2018-02-02

## 2018-02-02 NOTE — TELEPHONE ENCOUNTER
----- Message from Tory Naidu sent at 2/2/2018  8:23 AM CST -----  Contact: Dolly from Dr Beatty office   Dolly from Dr Beatty office need a copy of patient cystoscopy. Please fax to [phone] or please call office at [phone].

## 2018-02-26 DIAGNOSIS — I70.1 ATHEROSCLEROSIS OF RENAL ARTERY: Primary | ICD-10-CM

## 2018-02-26 DIAGNOSIS — N18.30 CHRONIC KIDNEY DISEASE, STAGE III (MODERATE): ICD-10-CM

## 2018-02-26 DIAGNOSIS — I10 ESSENTIAL HYPERTENSION, MALIGNANT: ICD-10-CM

## 2018-02-26 DIAGNOSIS — Z95.5 STENTED CORONARY ARTERY: ICD-10-CM

## 2018-02-26 DIAGNOSIS — E78.5 HYPERLIPIDEMIA, UNSPECIFIED HYPERLIPIDEMIA TYPE: ICD-10-CM

## 2018-02-26 DIAGNOSIS — I34.0 NONRHEUMATIC MITRAL VALVE INSUFFICIENCY: ICD-10-CM

## 2018-02-26 DIAGNOSIS — I71.30 ABDOMINAL ANEURYSM, RUPTURED: ICD-10-CM

## 2018-02-26 DIAGNOSIS — I25.10 ATHEROSCLEROSIS OF NATIVE CORONARY ARTERY WITHOUT ANGINA PECTORIS, UNSPECIFIED WHETHER NATIVE OR TRANSPLANTED HEART: ICD-10-CM

## 2018-02-26 DIAGNOSIS — E78.5 DYSLIPIDEMIA: ICD-10-CM

## 2018-03-10 ENCOUNTER — TELEPHONE (OUTPATIENT)
Dept: RADIOLOGY | Facility: HOSPITAL | Age: 58
End: 2018-03-10

## 2018-03-12 ENCOUNTER — HOSPITAL ENCOUNTER (OUTPATIENT)
Dept: RADIOLOGY | Facility: HOSPITAL | Age: 58
Discharge: HOME | End: 2018-03-12
Attending: INTERNAL MEDICINE
Payer: MEDICARE

## 2018-03-12 DIAGNOSIS — I71.30 ABDOMINAL ANEURYSM, RUPTURED: ICD-10-CM

## 2018-03-12 DIAGNOSIS — I25.10 ATHEROSCLEROSIS OF NATIVE CORONARY ARTERY WITHOUT ANGINA PECTORIS, UNSPECIFIED WHETHER NATIVE OR TRANSPLANTED HEART: ICD-10-CM

## 2018-03-12 DIAGNOSIS — N18.30 CHRONIC KIDNEY DISEASE, STAGE III (MODERATE): ICD-10-CM

## 2018-03-12 DIAGNOSIS — I70.1 ATHEROSCLEROSIS OF RENAL ARTERY: ICD-10-CM

## 2018-03-12 DIAGNOSIS — I10 ESSENTIAL HYPERTENSION, MALIGNANT: ICD-10-CM

## 2018-03-12 DIAGNOSIS — I34.0 NONRHEUMATIC MITRAL VALVE INSUFFICIENCY: ICD-10-CM

## 2018-03-12 DIAGNOSIS — Z95.5 STENTED CORONARY ARTERY: ICD-10-CM

## 2018-03-12 DIAGNOSIS — E78.5 DYSLIPIDEMIA: ICD-10-CM

## 2018-03-12 PROCEDURE — 76775 US EXAM ABDO BACK WALL LIM: CPT | Mod: 26,,, | Performed by: RADIOLOGY

## 2018-03-12 PROCEDURE — 76775 US EXAM ABDO BACK WALL LIM: CPT | Mod: TC,PO

## 2018-03-20 ENCOUNTER — OFFICE VISIT (OUTPATIENT)
Dept: CARDIOLOGY | Facility: CLINIC | Age: 58
End: 2018-03-20
Payer: MEDICARE

## 2018-03-20 VITALS
HEART RATE: 68 BPM | HEIGHT: 63 IN | SYSTOLIC BLOOD PRESSURE: 143 MMHG | WEIGHT: 276.88 LBS | DIASTOLIC BLOOD PRESSURE: 92 MMHG | BODY MASS INDEX: 49.06 KG/M2

## 2018-03-20 DIAGNOSIS — I70.1 ATHEROSCLEROTIC RAS (RENAL ARTERY STENOSIS), BILATERAL: Chronic | ICD-10-CM

## 2018-03-20 DIAGNOSIS — I34.0 NON-RHEUMATIC MITRAL REGURGITATION: Chronic | ICD-10-CM

## 2018-03-20 DIAGNOSIS — I25.10 CORONARY ARTERY DISEASE INVOLVING NATIVE CORONARY ARTERY OF NATIVE HEART WITHOUT ANGINA PECTORIS: Chronic | ICD-10-CM

## 2018-03-20 DIAGNOSIS — I10 HTN (HYPERTENSION), MALIGNANT: Chronic | ICD-10-CM

## 2018-03-20 DIAGNOSIS — Z95.5 STENTED CORONARY ARTERY: Primary | Chronic | ICD-10-CM

## 2018-03-20 DIAGNOSIS — I71.40 ABDOMINAL AORTIC ANEURYSM (AAA) WITHOUT RUPTURE: Chronic | ICD-10-CM

## 2018-03-20 DIAGNOSIS — E78.5 DYSLIPIDEMIA: Chronic | ICD-10-CM

## 2018-03-20 PROCEDURE — 99214 OFFICE O/P EST MOD 30 MIN: CPT | Mod: S$PBB,,, | Performed by: INTERNAL MEDICINE

## 2018-03-20 PROCEDURE — 99213 OFFICE O/P EST LOW 20 MIN: CPT | Mod: PBBFAC,PO | Performed by: INTERNAL MEDICINE

## 2018-03-20 PROCEDURE — 99999 PR PBB SHADOW E&M-EST. PATIENT-LVL III: CPT | Mod: PBBFAC,,, | Performed by: INTERNAL MEDICINE

## 2018-03-20 RX ORDER — AZITHROMYCIN 250 MG/1
TABLET, FILM COATED ORAL
Qty: 6 TABLET | Refills: 0 | Status: SHIPPED | OUTPATIENT
Start: 2018-03-20 | End: 2018-05-11 | Stop reason: ALTCHOICE

## 2018-03-20 NOTE — PROGRESS NOTES
Subjective:    Patient ID:  Leena Gibbs is a 57 y.o. female who presents for follow-up of AAA (Annual f/u - review AA and labs ) and Coronary Artery Disease      HPI  Ms. Gibbs here for follow up of resistant HTN/small AAA/MR. Patients states is doing well no chest pain, SOB or change in exertional tolerence.   C/o URI past 1 day.     Review of Systems   Constitution: Negative for malaise/fatigue.   Eyes: Negative for blurred vision.   Cardiovascular: Negative for chest pain, claudication, cyanosis, dyspnea on exertion, irregular heartbeat, leg swelling, near-syncope, orthopnea, palpitations, paroxysmal nocturnal dyspnea and syncope.   Respiratory: Negative for cough and shortness of breath.    Hematologic/Lymphatic: Does not bruise/bleed easily.   Musculoskeletal: Negative for back pain, falls, joint pain, muscle cramps, muscle weakness and myalgias.   Gastrointestinal: Negative for abdominal pain, change in bowel habit, nausea and vomiting.   Genitourinary: Negative for urgency.   Neurological: Negative for dizziness, focal weakness and light-headedness.        Objective:    Physical Exam   Constitutional: She is oriented to person, place, and time. She appears well-developed and well-nourished.   Neck: Normal range of motion. No JVD present.   Cardiovascular: Normal rate, regular rhythm, normal heart sounds and intact distal pulses.    Pulmonary/Chest: Effort normal and breath sounds normal.   Neurological: She is alert and oriented to person, place, and time.   Skin: Skin is warm and dry.   Psychiatric: She has a normal mood and affect.             ..    Chemistry        Component Value Date/Time     03/12/2018 1148    K 4.2 03/12/2018 1148     03/12/2018 1148    CO2 25 03/12/2018 1148    BUN 33 (H) 03/12/2018 1148    CREATININE 1.8 (H) 03/12/2018 1148    GLU 78 03/12/2018 1148        Component Value Date/Time    CALCIUM 10.5 03/12/2018 1148    ALKPHOS 79 03/12/2018 1148    AST 14  03/12/2018 1148    AST 18 11/28/2015 1922    ALT 10 03/12/2018 1148    BILITOT 0.4 03/12/2018 1148    ESTGFRAFRICA 35.5 (A) 03/12/2018 1148    EGFRNONAA 30.8 (A) 03/12/2018 1148            ..  Lab Results   Component Value Date    CHOL 191 03/12/2018    CHOL 257 (H) 02/24/2017    CHOL 148 01/10/2012     Lab Results   Component Value Date    HDL 44 03/12/2018    HDL 35 (L) 02/24/2017    HDL 36 (L) 01/10/2012     Lab Results   Component Value Date    LDLCALC 128.8 03/12/2018    LDLCALC 178.2 (H) 02/24/2017    LDLCALC 81.0 01/10/2012     Lab Results   Component Value Date    TRIG 91 03/12/2018    TRIG 219 (H) 02/24/2017    TRIG 154 (H) 01/10/2012     Lab Results   Component Value Date    CHOLHDL 23.0 03/12/2018    CHOLHDL 13.6 (L) 02/24/2017    CHOLHDL 24.3 01/10/2012     ..  Lab Results   Component Value Date    WBC 4.79 03/12/2018    HGB 14.2 03/12/2018    HCT 45.2 03/12/2018    MCV 89 03/12/2018     03/12/2018       Test(s) Reviewed  I have reviewed the following in detail:  [] Stress test   [] Angiography   [] Echocardiogram   [x] Labs   [x] Other:       Assessment:         ICD-10-CM ICD-9-CM   1. Stented coronary artery Z95.5 V45.82   2. Dyslipidemia E78.5 272.4   3. Atherosclerotic DEJA (renal artery stenosis), bilateral I70.1 440.1   4. Abdominal aortic aneurysm (AAA) without rupture I71.4 441.4   5. Non-rheumatic mitral regurgitation I34.0 424.0   6. HTN (hypertension), malignant I10 401.0   7. Coronary artery disease involving native coronary artery of native heart without angina pectoris I25.10 414.01     Problem List Items Addressed This Visit     AAA (abdominal aortic aneurysm) (Chronic)    Overview     1/2014  Findings:  Real-time, color flow and Doppler imaging of the aorta was performed.  Atherosclerotic plaque identified within the distal aorta and proximal common iliac arteries. There is mild saccular aneurysmal dilatation/ focal ectasia of the distal abdominal aorta.  Proximal aorta: 3.1 cm AP x  2.9 cm TRV.   Mid aorta: 2.0 cm x 2.3 cm.  Distal aorta: 3.4 cm x 2.6 cm. (3.0 cm in length)  Proximal common iliac arteries measured 1.3 cm and 1.1 cm maximum diameter on the right and left respectively.      Result Impression    1. Atherosclerotic plaque and mild ectasia/saccular aneurysmal dilatation of the distal abd aorta.  2. No other cystic findings.  3. See discussion above. Further evaluation and/or follow-up imaging as warranted.   Electronically signed by: MARRY MARCUS III, MD       1/12 3.3 cm         Atherosclerotic DEJA (renal artery stenosis), bilateral (Chronic)    Overview     2/2015 Rt renal- 40%, Lt renal - 30%         CAD (coronary artery disease) (Chronic)    Overview     2/2015 LAD- 50% mid and distal, Cx- 20%, OM2 80%, smll branch 60%, RCA- non-dom 30%         Dyslipidemia (Chronic)    HTN (hypertension), malignant (Chronic)    Overview     11/15/2010 bilateral Renal, 10% stenosis         Mitral regurgitation (Chronic)    Overview     17/13 mod/sev         Stented coronary artery - Primary (Chronic)    Overview     2/2015 OM2- promus 2.75x30                Plan:           Return to clinic 1 year   Low level/low impact aerobic exercise 5x's/wk. Heart healthy diet and risk factor modification.    See labs and med orders.

## 2018-03-22 ENCOUNTER — TELEPHONE (OUTPATIENT)
Dept: GASTROENTEROLOGY | Facility: CLINIC | Age: 58
End: 2018-03-22

## 2018-03-22 NOTE — TELEPHONE ENCOUNTER
----- Message from Flores Moscoso sent at 3/22/2018  8:53 AM CDT -----  Patient having a procedure on 4/3/18 that she has a few questions about. Please call back at [phone].

## 2018-03-22 NOTE — TELEPHONE ENCOUNTER
----- Message from Alexandre Zayas sent at 3/22/2018 11:35 AM CDT -----  Contact: Self  Patient returned phone call. Please call [phone] (home)

## 2018-04-03 ENCOUNTER — ANESTHESIA EVENT (OUTPATIENT)
Dept: ENDOSCOPY | Facility: HOSPITAL | Age: 58
End: 2018-04-03
Payer: MEDICARE

## 2018-04-03 ENCOUNTER — HOSPITAL ENCOUNTER (OUTPATIENT)
Facility: HOSPITAL | Age: 58
Discharge: HOME | End: 2018-04-03
Attending: INTERNAL MEDICINE | Admitting: INTERNAL MEDICINE
Payer: MEDICARE

## 2018-04-03 ENCOUNTER — ANESTHESIA (OUTPATIENT)
Dept: ENDOSCOPY | Facility: HOSPITAL | Age: 58
End: 2018-04-03
Payer: MEDICARE

## 2018-04-03 ENCOUNTER — SURGERY (OUTPATIENT)
Age: 58
End: 2018-04-03

## 2018-04-03 VITALS
TEMPERATURE: 98 F | HEART RATE: 76 BPM | RESPIRATION RATE: 10 BRPM | DIASTOLIC BLOOD PRESSURE: 87 MMHG | HEIGHT: 64 IN | SYSTOLIC BLOOD PRESSURE: 139 MMHG | WEIGHT: 275 LBS | BODY MASS INDEX: 46.95 KG/M2 | OXYGEN SATURATION: 100 %

## 2018-04-03 DIAGNOSIS — Z86.010 HX OF COLONIC POLYPS: ICD-10-CM

## 2018-04-03 PROBLEM — Z86.0100 HX OF COLONIC POLYPS: Status: ACTIVE | Noted: 2018-04-03

## 2018-04-03 LAB — GLUCOSE SERPL-MCNC: 86 MG/DL (ref 70–110)

## 2018-04-03 PROCEDURE — 37000008 HC ANESTHESIA 1ST 15 MINUTES: Mod: PO | Performed by: INTERNAL MEDICINE

## 2018-04-03 PROCEDURE — D9220A PRA ANESTHESIA: Mod: PT,ANES,, | Performed by: ANESTHESIOLOGY

## 2018-04-03 PROCEDURE — 82962 GLUCOSE BLOOD TEST: CPT | Mod: PO | Performed by: INTERNAL MEDICINE

## 2018-04-03 PROCEDURE — 37000009 HC ANESTHESIA EA ADD 15 MINS: Mod: PO | Performed by: INTERNAL MEDICINE

## 2018-04-03 PROCEDURE — 88305 TISSUE EXAM BY PATHOLOGIST: CPT | Mod: 26,,, | Performed by: PATHOLOGY

## 2018-04-03 PROCEDURE — 45385 COLONOSCOPY W/LESION REMOVAL: CPT | Mod: PT,,, | Performed by: INTERNAL MEDICINE

## 2018-04-03 PROCEDURE — 27201089 HC SNARE, DISP (ANY): Mod: PO | Performed by: INTERNAL MEDICINE

## 2018-04-03 PROCEDURE — 63600175 PHARM REV CODE 636 W HCPCS: Mod: PO | Performed by: NURSE ANESTHETIST, CERTIFIED REGISTERED

## 2018-04-03 PROCEDURE — 88305 TISSUE EXAM BY PATHOLOGIST: CPT | Performed by: PATHOLOGY

## 2018-04-03 PROCEDURE — D9220A PRA ANESTHESIA: Mod: PT,CRNA,, | Performed by: NURSE ANESTHETIST, CERTIFIED REGISTERED

## 2018-04-03 PROCEDURE — 45385 COLONOSCOPY W/LESION REMOVAL: CPT | Mod: PO | Performed by: INTERNAL MEDICINE

## 2018-04-03 RX ORDER — PROPOFOL 10 MG/ML
VIAL (ML) INTRAVENOUS
Status: DISCONTINUED | OUTPATIENT
Start: 2018-04-03 | End: 2018-04-03

## 2018-04-03 RX ORDER — LIDOCAINE HCL/PF 100 MG/5ML
SYRINGE (ML) INTRAVENOUS
Status: DISCONTINUED | OUTPATIENT
Start: 2018-04-03 | End: 2018-04-03

## 2018-04-03 RX ORDER — SODIUM CHLORIDE, SODIUM LACTATE, POTASSIUM CHLORIDE, CALCIUM CHLORIDE 600; 310; 30; 20 MG/100ML; MG/100ML; MG/100ML; MG/100ML
INJECTION, SOLUTION INTRAVENOUS CONTINUOUS
Status: DISCONTINUED | OUTPATIENT
Start: 2018-04-03 | End: 2018-04-03 | Stop reason: HOSPADM

## 2018-04-03 RX ADMIN — PROPOFOL 30 MG: 10 INJECTION, EMULSION INTRAVENOUS at 10:04

## 2018-04-03 RX ADMIN — LIDOCAINE HYDROCHLORIDE 100 MG: 20 INJECTION, SOLUTION INTRAVENOUS at 10:04

## 2018-04-03 RX ADMIN — PROPOFOL 100 MG: 10 INJECTION, EMULSION INTRAVENOUS at 10:04

## 2018-04-03 RX ADMIN — SODIUM CHLORIDE, SODIUM LACTATE, POTASSIUM CHLORIDE, CALCIUM CHLORIDE: 600; 310; 30; 20 INJECTION, SOLUTION INTRAVENOUS at 09:04

## 2018-04-03 NOTE — TRANSFER OF CARE
"Anesthesia Transfer of Care Note    Patient: Leena Pardofield    Procedure(s) Performed: Procedure(s) (LRB):  COLONOSCOPY (N/A)    Patient location: PACU    Anesthesia Type: general    Transport from OR: Transported from OR on room air with adequate spontaneous ventilation    Post pain: adequate analgesia    Post assessment: no apparent anesthetic complications and tolerated procedure well    Post vital signs: stable    Level of consciousness: awake and sedated    Nausea/Vomiting: no nausea/vomiting    Complications: none    Transfer of care protocol was followed      Last vitals:   Visit Vitals  BP (!) 142/90 (BP Location: Right arm, Patient Position: Lying)   Pulse 74   Temp 36.3 °C (97.3 °F) (Skin)   Resp 20   Ht 5' 4" (1.626 m)   Wt 124.7 kg (275 lb)   SpO2 100%   Breastfeeding? No   BMI 47.20 kg/m²     "

## 2018-04-03 NOTE — H&P
"History & Physical - Short Stay  Gastroenterology      SUBJECTIVE:     Procedure: Colonoscopy    Chief Complaint/Indication for Procedure: Surveillance, Hx of colon polyps    History of Present Illness:  Office Visit     10/23/2017  [facility]   NOAM Ayers   Gastroenterology   History of diverticulosis +2 more   Dx   Advice Only    Reason for Visit       Progress Notes        Subjective:       Patient ID: Leena Gibbs is a 57 y.o. female Body mass index is 49.78 kg/m².     Chief Complaint: Advice Only ( diverticulosis)     This patient is new to me.  Established patient of Dr. Post (last in 2011).     Patient seen for colorectal cancer screening, high risk due to personal history of colon polyp, age appropriate, 2nd occurrence, last colonoscopy was in 11/2011: see surgical history, with no associated signs/symptoms (see ROS), and no alleviating/exacerbating factors. Family history of colon cancer in father, diagnosed in his 60's.     GI Problem   Primary symptoms do not include fever, weight loss, abdominal pain, nausea, vomiting, diarrhea, melena, hematemesis, jaundice or hematochezia. Primary symptoms comment: CHIEF COMPLAINT: thought she had a kidney stone and was being worked up for it; had ct scan which showed diverticulosis.   The illness is also significant for back pain (occasional spasms). The illness does not include dysphagia, odynophagia or constipation. Significant associated medical issues include gallstones (s/p cholecystectomy in 1986). Associated medical issues do not include inflammatory bowel disease or diverticulitis (positive history of diverticulosis).     10/16/17 ct abdomen pelvis (found in care everywhere) was reviewed and radiology report states:  " FINDINGS: The lung bases are clear. The base of the heart appears mildly enlarged. Coronary vessel endovascular stents are noted and there is coronary vessel atherosclerosis.  The patient is status post " "cholecystectomy. No biliary dilatation. No abnormality of the unenhanced liver, spleen, or pancreas. Adrenal glands appear unremarkable. Symmetrical renal size without hydronephrosis. 2 cm parapelvic cyst in association with   the left kidney. 1.7 cm cyst in the upper pole of the right kidney.  There is no bowel obstruction or inflammation. Scattered colonic diverticuli without CT evidence of acute diverticulitis. No intraperitoneal free air or free fluid. Incidental note of an omental fat-containing umbilical hernia.  Patchy calcified plaques of the abdominal aorta with focal ectasia of the infrarenal abdominal aorta measuring 2.1 x 2.0 cm in greatest transverse dimension. Ectasia is best visualized on coronal sequences as depicted on series 300 B image 50). No   appreciable lymphadenopathy. Hysterectomy. Urinary bladder is unremarkable. There are degenerative changes of the hips, sacroiliac joints, and lumbar spine.  IMPRESSION:   1.  No acute findings or abnormalities.  2. Bilateral renal cysts.  3. Colonic diverticulosis.  4. Abdominal aortic atherosclerosis with infrarenal abdominal aortic ectasia.  "    Assessment:       1. History of diverticulosis    2. History of colon polyps    3. Family history of colon cancer        Plan:       History of diverticulosis  - schedule Colonoscopy, discussed procedure with the patient, verbalized understanding  - discussed the diagnosis of diverticulosis and diverticulitis, & to prevent diverticulitis, high fiber diet is recommended.  - Recommended daily exercise, adequate water intake (six 8-oz glasses of water daily), and high fiber diet. OTC fiber supplements are recommended if diet does not reach daily fiber goal (25 grams daily), such as Metamucil, Citrucel, or FiberCon (take as directed, separate from other oral medications by >2 hours).  - Advised to avoid/minimize popcorn, corn, seeds, and nuts.     History of colon polyps & Family history of colon cancer  - schedule " Colonoscopy, discussed procedure with the patient, patient verbalized understanding     Return in about 1 month (around 11/23/2017), or if symptoms worsen or fail to improve.      If no improvement in symptoms or symptoms worsen, call/follow-up at clinic or go to ER.              PTA Medications   Medication Sig    amlodipine (NORVASC) 10 MG tablet Take 10 mg by mouth every evening.     calcitRIOL (ROCALTROL) 0.5 MCG Cap Take by mouth once daily. 1 mg daily    carvedilol (COREG) 25 MG tablet Take 1 tablet (25 mg total) by mouth 2 (two) times daily with meals.    cloNIDine (CATAPRES) 0.1 MG tablet Take 1 tablet (0.1 mg total) by mouth as needed (as needed for diastolic pressure greater than 100).    clopidogrel (PLAVIX) 75 mg tablet Take 1 tablet (75 mg total) by mouth once daily. (Patient taking differently: Take 75 mg by mouth every Mon, Wed, Fri. )    ergocalciferol (ERGOCALCIFEROL) 50,000 unit Cap Take 50,000 Units by mouth every 7 days.     febuxostat (ULORIC) 80 mg Tab Take 80 mg by mouth once daily.     hydrocodone-acetaminophen 10-325mg (NORCO)  mg Tab Take 1 tablet by mouth after meals as needed.    isosorbide mononitrate (IMDUR) 60 MG 24 hr tablet Take 1 tablet (60 mg total) by mouth once daily.    LANTUS SOLOSTAR 100 unit/mL (3 mL) InPn pen Inject 5 Units as directed every evening.     lisinopril (PRINIVIL,ZESTRIL) 20 MG tablet Take 1 tablet (20 mg total) by mouth every morning.    prednisoLONE acetate (PRED FORTE) 1 % DrpS Place 1 drop into the left eye once daily.    rosuvastatin (CRESTOR) 10 MG tablet Take 1 tablet (10 mg total) by mouth every evening.    triamterene-hydrochlorothiazide 37.5-25 mg (MAXZIDE-25) 37.5-25 mg per tablet Take 1 tablet by mouth once daily.    acetaminophen (TYLENOL) 325 MG tablet Take 1 tablet (325 mg total) by mouth every 6 (six) hours as needed for Pain.    azithromycin (Z-STEFF) 250 MG tablet 2 po QD x1 then 1 po QD    ciprofloxacin HCl (CIPRO) 500 MG  "tablet     methylPREDNISolone (MEDROL DOSEPACK) 4 mg tablet     ULTICARE PEN NEEDLE 31 gauge x 5/16" Ndle        Review of patient's allergies indicates:   Allergen Reactions    Sulfamethoxazole-trimethoprim      Affects my Kidneys    Sulfa (sulfonamide antibiotics) Other (See Comments)     Due to stage 3 kindney disease        Past Medical History:   Diagnosis Date    AAA (abdominal aortic aneurysm)     Anticoagulant long-term use     plavix    Arthritis     Blindness of left eye     Colon polyp     Coronary artery disease     CVA (cerebral infarction)     x2; 12/27/2006 & 1/18/2010    Diverticulosis     DM (diabetes mellitus)     Dyspnea on exertion     Gout     Hyperlipidemia     Hypertension     Kidney disease     stage 3, sees nephrologist Dr Beatty    Mitral valve regurgitation     PONV (postoperative nausea and vomiting)     Renal failure     stage 3, per patient.  Sees Dr Dorothy Beatty    Sleep apnea with use of continuous positive airway pressure (CPAP)     Stroke     does not use any assistive devices     Past Surgical History:   Procedure Laterality Date    BREAST BIOPSY      benign    CARDIAC CATHETERIZATION  02/2015    with 1 stent    CARDIAC CATHETERIZATION  05/2016    CHOLECYSTECTOMY      COLONOSCOPY  11/04/2011    Dr. Post, in legacy: colon polyps removed, reduntant colon; repeat in 3 years; biopsy: 1. TUBULAR ADENOMA. & 2. tubulovillous adenoma    CORNEAL TRANSPLANT Left     twice    EYE SURGERY Left     cornea transplant twice    KNEE SURGERY      Right    TOTAL ABDOMINAL HYSTERECTOMY       Family History   Problem Relation Age of Onset    Stroke Father     Cancer Father     Hypertension Father     Diabetes Father     Colon cancer Father      diagnosed in his 60's    Hypertension Mother     Heart disease Mother     Diabetes Sister     Diabetes Brother     Cancer Paternal Grandfather      colon    Crohn's disease Neg Hx     Ulcerative colitis Neg Hx " "     Social History   Substance Use Topics    Smoking status: Never Smoker    Smokeless tobacco: Never Used    Alcohol use No      Comment: rarely         OBJECTIVE:     Vital Signs (Most Recent)  Temp: 97.3 °F (36.3 °C) (04/03/18 0912)  Pulse: 74 (04/03/18 0912)  Resp: 20 (04/03/18 0912)  BP: (!) 142/90 (04/03/18 0912)  SpO2: 100 % (room air) (04/03/18 0912)    Physical Exam:          :  Ht 5' 3" (1.6 m)    Wt 127.5 kg (281 lb)    BMI 49.78 kg/m²                                                 GENERAL:  Comfortable, in no acute distress.                                 HEENT EXAM:  Nonicteric.  No adenopathy.  Oropharynx is clear.               NECK:  Supple.                                                               LUNGS:  Clear.                                                               CARDIAC:  Regular rate and rhythm.  S1, S2.  No murmur.                      ABDOMEN:  Obese.  Soft, positive bowel sounds, nontender.  No hepatosplenomegaly or masses.  No rebound or guarding.                                             EXTREMITIES:  No edema.     MENTAL STATUS:  Alert and oriented.    ASSESSMENT/PLAN:     Assessment: Surveillance, Hx of colon polyps.  FHx colon cancer (father, PGF)    Plan: Colonoscopy    Anesthesia Plan:   MAC / General Anaesthesia    ASA Grade: ASA 2 - Patient with mild systemic disease with no functional limitations    MALLAMPATI SCORE: II (hard and soft palate, upper portion of tonsils anduvula visible)    "

## 2018-04-03 NOTE — ANESTHESIA POSTPROCEDURE EVALUATION
"Anesthesia Post Evaluation    Patient: Leena Pardofield    Procedure(s) Performed: Procedure(s) (LRB):  COLONOSCOPY (N/A)    Final Anesthesia Type: general  Patient location during evaluation: PACU  Patient participation: Yes- Able to Participate  Level of consciousness: awake and alert  Post-procedure vital signs: reviewed and stable  Pain management: adequate  Airway patency: patent  PONV status at discharge: No PONV  Anesthetic complications: no      Cardiovascular status: hemodynamically stable  Respiratory status: unassisted and room air  Hydration status: euvolemic  Follow-up not needed.        Visit Vitals  /87   Pulse 76   Temp 36.7 °C (98 °F) (Skin)   Resp 10   Ht 5' 4" (1.626 m)   Wt 124.7 kg (275 lb)   SpO2 100%   Breastfeeding? No   BMI 47.20 kg/m²       Pain/Reji Score: Pain Assessment Performed: Yes (4/3/2018 11:05 AM)  Presence of Pain: denies (4/3/2018 11:05 AM)  Reji Score: 10 (4/3/2018 11:05 AM)      "

## 2018-04-03 NOTE — PROVATION PATIENT INSTRUCTIONS
Discharge Summary/Instructions for after Colonoscopy with   Biopsy/Polypectomy  [facility]    Tuesday, April 03, 2018  Jesus Post MD  RESTRICTIONS ON ACTIVITY:  - Do not drive a car or operate machinery until the day after the procedure.      - The following day: return to full activity including work.  - For  3 days: No heavy lifting, straining or running.  - Diet: You can have solid foods, but no gassy foods (i.e. beans, broccoli,   cabbage, etc).  TREATMENT FOR COMMON SIDE EFFECTS:  - Mild abdominal pain and bloating or excessive gas: rest, eat lightly and   use a heating pad.  SYMPTOMS TO WATCH FOR AND REPORT TO YOUR PHYSICIAN:  1. Severe abdominal pain.  2. Fever within 24 hours after a procedure.  3. A large amount of rectal bleeding. (A small amount of blood from the   rectum is not serious, especially if hemorrhoids are present.  3.  Because air was put into your colon during the procedure, expelling   large amounts of air from your rectum is normal.  4.  You may not have a bowel movement for 1-3 days because of the   colonoscopy prep.  This is normal.  5.  Call immediately if you notice any of the following:   Chills and/or fever over 101   Persistent vomiting   Severe abdominal pain, other than gas cramps   Severe chest pain   Black, tarry stools   Any bleeding - exceeding one tablespoon  Your doctor recommends these additional instructions:  We are waiting for your pathology results.   Your physician has recommended a repeat colonoscopy in 5 years for   surveillance.   Eat a high fiber diet.   Take a fiber supplement, for example Citrucel, Fibercon, Konsyl or   Metamucil.   Take a PROBIOTIC, such as a carton of GREEK YOGURT (Chobani or Oikos,  or   Activia or Dannon);  or tablets of ALIGN or CULTURELLE or SUSAN-Q (all   non-prescription), every day for a month.   And repeat this 3-4 times a   year.  Call the [facility] clinic in 2 weeks for reports (if you haven't heard from us   sooner)   [phone].  None  If you have any questions or problems, please call your physician.  EMERGENCY PHONE NUMBER: [phone]  LAB RESULTS: Call in two (2) weeks for lab results, [phone]  ___________________________________________  Nurse Signature  ___________________________________________  Patient/Designated Responsible Party Signature  Jesus Post MD  4/3/2018 11:07:09 AM  This report has been verified and signed electronically.

## 2018-04-03 NOTE — DISCHARGE INSTRUCTIONS
Recovery After Procedural Sedation (Adult)  You have been given medicine by vein to make you sleep during your surgery. This may have included both a pain medicine and sleeping medicine. Most of the effects have worn off. But you may still have some drowsiness for the next 6 to 8 hours.  Home care  Follow these guidelines when you get home:  · For the next 8 hours, you should be watched by a responsible adult. This person should make sure your condition is not getting worse.  · Don't drink any alcohol for the next 24 hours.  · Don't drive, operate dangerous machinery, or make important business or personal decisions during the next 24 hours.  Note: Your healthcare provider may tell you not to take any medicine by mouth for pain or sleep in the next 4 hours. These medicines may react with the medicines you were given in the hospital. This could cause a much stronger response than usual.  Follow-up care  Follow up with your healthcare provider if you are not alert and back to your usual level of activity within 12 hours.  When to seek medical advice  Call your healthcare provider right away if any of these occur:  · Drowsiness gets worse  · Weakness or dizziness gets worse  · Repeated vomiting  · You can't be awakened   Date Last Reviewed: 10/18/2016  © 7630-6105 [employer]. [address] [place] [address], [place] [address]. All rights reserved. This information is not intended as a substitute for professional medical care. Always follow your healthcare professional's instructions.      Wait 5 days before restarting your PLAVIX.  (Restart Monday, April 9)      PROBIOTICS:  Now that your colon is so cleaned out, now is a good time for a round of PROBIOTICS.  Eat a container of Greek Yogurt, such as OIKOS or CHOBANI,  Or Activia or Dannon    Greek Yogurt.    Or Take a similar Probiotic product such as Align or Culturelle or Yajaira-Q, every day for a month.                  (The products listed are  non-prescription, but you may need to ask the pharmacist for their location.)Repeat this 3-4 times a year.        High-Fiber Diet  Fiber is in fruits, vegetables, cereals, and grains. Fiber passes through your body undigested. A high-fiber diet helps food move through your intestinal tract. The added bulk is helpful in preventing constipation. In people with diverticulosis, fiber helps clean out the pouches along the colon wall. It also prevents new pouches from forming. A high-fiber diet reduces the risk of colon cancer. It also lowers blood cholesterol and prevents high blood sugar in people with diabetes.    The fiber-rich foods listed below should be part of your diet. If you are not used to high-fiber foods, start with 1 or 2 foods from this list. Every 3 to 4 days add a new one to your diet. Do this until you are eating 4 high-fiber foods per day. This should give you 20 to 35 grams of fiber a day. It is also important to drink a lot of water when you are on this diet. You should have 6 to 8 glasses of water a day. Water makes the fiber swell and increases the benefit.  Foods high in dietary fiber  The following foods are high in dietary fiber:  · Breads. Breads made with 100% whole-wheat flour; dylan, wheat, or rye crackers; whole-grain tortillas, bran muffins.  · Cereals. Whole-grain and bran cereals with bran (shredded wheat, wheat flakes, raisin bran, corn bran); oatmeal, rolled oats, granola, and brown rice.  · Fruits. Fresh fruits and their edible skins (pears, prunes, raisins, berries, apples, and apricots); bananas, citrus fruit, mangoes, pineapple; and prune juice.  · Nuts. Any nuts and seeds.  · Vegetables. Best served raw or lightly cooked. All types, especially: green peas, celery, eggplant, potatoes, spinach, broccoli, [place] sprouts, winter squash, carrots, cauliflower, soybeans, lentils, and fresh and dried beans of all kinds.  · Other. Popcorn, any spices.  Date Last Reviewed: 8/1/2016  ©  1143-0335 [employer]. [address] [place] [address], [place] [address]. All rights reserved. This information is not intended as a substitute for professional medical care. Always follow your healthcare professional's instructions.

## 2018-04-03 NOTE — ANESTHESIA PREPROCEDURE EVALUATION
04/03/2018  Leena Gibbs is a 57 y.o., female.    Anesthesia Evaluation    I have reviewed the Patient Summary Reports.    I have reviewed the Nursing Notes.   I have reviewed the Medications.     Review of Systems  Anesthesia Hx:  Hx of Anesthetic complications PONV   Cardiovascular:   Hypertension Valvular problems/Murmurs, MR CAD   hyperlipidemia    Pulmonary:   Shortness of breath Sleep Apnea, CPAP    Renal/:   Chronic Renal Disease, CRI    Neurological:   CVA    Endocrine:   Diabetes, type 2        Physical Exam  General:  Morbid Obesity    Airway/Jaw/Neck:  Airway Findings: Mouth Opening: Normal Tongue: Normal  General Airway Assessment: Adult  Mallampati: I  TM Distance: Normal, at least 6 cm  Jaw/Neck Findings:  Neck ROM: Normal ROM  Neck Findings:  Girth Increased     Eyes/Ears/Nose:  Eyes/Ears/Nose Findings:  Blind in left eye   Dental:  DENTAL FINDINGS: Normal   Chest/Lungs:  Chest/Lungs Findings: Clear to auscultation, Normal Respiratory Rate     Heart/Vascular:  Heart Findings: Rate: Normal  Rhythm: Regular Rhythm  Heart Murmur  Systolic  Systolic Heart Murmur Description: Holosystolic, L Upper Sternal Border  Systolic Heart Murmur Grade: Grade IV or Greater        Mental Status:  Mental Status Findings:  Cooperative, Alert and Oriented         Anesthesia Plan  Type of Anesthesia, risks & benefits discussed:  Anesthesia Type:  general  Patient's Preference:   Intra-op Monitoring Plan: standard ASA monitors  Intra-op Monitoring Plan Comments:   Post Op Pain Control Plan:   Post Op Pain Control Plan Comments:   Induction:   IV  Beta Blocker:  Patient is on a Beta-Blocker and has received one dose within the past 24 hours (No further documentation required).       Informed Consent: Patient understands risks and agrees with Anesthesia plan.  Questions answered. Anesthesia consent signed  with patient.  ASA Score: 3     Day of Surgery Review of History & Physical: I have interviewed and examined the patient. I have reviewed the patient's H&P dated:  There are no significant changes.  H&P update referred to the provider.         Ready For Surgery From Anesthesia Perspective.

## 2018-04-03 NOTE — BRIEF OP NOTE
Discharge Note  Short Stay      SUMMARY     Admit Date: 4/3/2018    Attending Physician: Jesus Post Jr., MD     Discharge Physician: Jesus Post Jr., MD    Discharge Date: 4/3/2018 11:09 AM    Final Diagnosis: History of colon polyps [Z86.010]  Family history of colon cancer [Z80.0]    Impression:          - One 4 to 5 mm polyp in the mid ascending colon,                        removed with a hot snare. Resected and retrieved.                       - Diverticulosis in the sigmoid colon.                       - Non-bleeding internal hemorrhoids.                       - The examination was otherwise normal.                       - The examined portion of the ileum was normal.  Recommendation:      - Discharge patient to home.                       - Await pathology results.                       - Repeat colonoscopy in 5 years for surveillance.                       - High fiber diet.                       - Use fiber, for example Citrucel, Fibercon, Konsyl                        or Metamucil.                       - Take a PROBIOTIC, such as a carton of GREEK YOGURT                        (Chobani or Oikos, or Activia or Dannon); or tablets                        of ALIGN or CULTURELLE or SUSAN-Q (all                        non-prescription), every day for a month.                       - Call the [facility] in 2 weeks for reports (if                        you haven't heard from us sooner) [phone].                       - Continue present medications.                       - Patient has a contact number available for                        emergencies. The signs and symptoms of potential                        delayed complications were discussed with the                        patient. Return to normal activities tomorrow.                        Written discharge instructions were provided to the                        patient.                       - Return to normal activities tomorrow.  Jesus Leong  TEAGAN Post MD  4/3/2018    Disposition: HOME OR SELF CARE    Patient Instructions:     Wait 5 days before restarting your PLAVIX.      Current Discharge Medication List      CONTINUE these medications which have NOT CHANGED    Details   amlodipine (NORVASC) 10 MG tablet Take 10 mg by mouth every evening.       calcitRIOL (ROCALTROL) 0.5 MCG Cap Take by mouth once daily. 1 mg daily      carvedilol (COREG) 25 MG tablet Take 1 tablet (25 mg total) by mouth 2 (two) times daily with meals.  Qty: 180 tablet, Refills: 4    Associated Diagnoses: Stented coronary artery; Dyslipidemia; Mitral valve insufficiency, unspecified etiology; HTN (hypertension), malignant; Coronary artery disease involving native coronary artery of native heart without angina pectoris; Atherosclerotic DEJA (renal artery stenosis), bilateral; Abdominal aortic aneurysm without rupture      cloNIDine (CATAPRES) 0.1 MG tablet Take 1 tablet (0.1 mg total) by mouth as needed (as needed for diastolic pressure greater than 100).  Qty: 90 tablet, Refills: 5      clopidogrel (PLAVIX) 75 mg tablet Take 1 tablet (75 mg total) by mouth once daily.  Qty: 90 tablet, Refills: 5    Associated Diagnoses: Atherosclerotic DEJA (renal artery stenosis), bilateral; Coronary artery disease due to lipid rich plaque; Stented coronary artery; Dyslipidemia; Ruptured abdominal aortic aneurysm (AAA); Non-rheumatic mitral regurgitation; HTN (hypertension), malignant      ergocalciferol (ERGOCALCIFEROL) 50,000 unit Cap Take 50,000 Units by mouth every 7 days.       febuxostat (ULORIC) 80 mg Tab Take 80 mg by mouth once daily.       hydrocodone-acetaminophen 10-325mg (NORCO)  mg Tab Take 1 tablet by mouth after meals as needed.  Qty: 45 tablet, Refills: 0      isosorbide mononitrate (IMDUR) 60 MG 24 hr tablet Take 1 tablet (60 mg total) by mouth once daily.  Qty: 90 tablet, Refills: 3      LANTUS SOLOSTAR 100 unit/mL (3 mL) InPn pen Inject 5 Units as directed every evening.      "  lisinopril (PRINIVIL,ZESTRIL) 20 MG tablet Take 1 tablet (20 mg total) by mouth every morning.  Qty: 90 tablet, Refills: 6    Associated Diagnoses: Atherosclerotic DEJA (renal artery stenosis), bilateral; Coronary artery disease due to lipid rich plaque; Stented coronary artery; Dyslipidemia; Ruptured abdominal aortic aneurysm (AAA); Non-rheumatic mitral regurgitation; HTN (hypertension), malignant      prednisoLONE acetate (PRED FORTE) 1 % DrpS Place 1 drop into the left eye once daily.      rosuvastatin (CRESTOR) 10 MG tablet Take 1 tablet (10 mg total) by mouth every evening.  Qty: 90 tablet, Refills: 4    Associated Diagnoses: Atherosclerotic DEJA (renal artery stenosis), bilateral; Coronary artery disease due to lipid rich plaque; Stented coronary artery; Dyslipidemia; Ruptured abdominal aortic aneurysm (AAA); Non-rheumatic mitral regurgitation; HTN (hypertension), malignant      triamterene-hydrochlorothiazide 37.5-25 mg (MAXZIDE-25) 37.5-25 mg per tablet Take 1 tablet by mouth once daily.  Qty: 90 tablet, Refills: 6      acetaminophen (TYLENOL) 325 MG tablet Take 1 tablet (325 mg total) by mouth every 6 (six) hours as needed for Pain.      azithromycin (Z-STEFF) 250 MG tablet 2 po QD x1 then 1 po QD  Qty: 6 tablet, Refills: 0      ciprofloxacin HCl (CIPRO) 500 MG tablet       methylPREDNISolone (MEDROL DOSEPACK) 4 mg tablet       ULTICARE PEN NEEDLE 31 gauge x 5/16" Ndle              Discharge Procedure Orders (must include Diet, Follow-up, Activity)    Follow Up:  Follow up with PCP as per your routine.  Please follow a high fiber diet.  Activity as tolerated.    No driving day of procedure.    PROBIOTICS:  Now that your colon is so cleaned out, now is a good time for a round of PROBIOTICS.  Eat a container of Greek Yogurt, such as OIKOS or CHOBANI,  Or Activia or Dannon    Greek Yogurt.    Or Take a similar Probiotic product such as Align or Culturelle or Yajaira-Q, every day for a month.                  (The " products listed are non-prescription, but you may need to ask the pharmacist for their location.)Repeat this 3-4 times a year.

## 2018-04-05 DIAGNOSIS — R39.15 URINARY URGENCY: ICD-10-CM

## 2018-04-05 DIAGNOSIS — N32.81 OAB (OVERACTIVE BLADDER): Primary | ICD-10-CM

## 2018-04-05 NOTE — TELEPHONE ENCOUNTER
----- Message from Ada Pagan sent at 4/5/2018  3:50 PM CDT -----  Contact: self [phone]  She said that the other urologists office told her that her records are in storage because it has been 8 years so she can't get the name of the medication.  Please call her.  Thank you!

## 2018-04-06 RX ORDER — FESOTERODINE FUMARATE 8 MG/1
8 TABLET, EXTENDED RELEASE ORAL DAILY
Qty: 30 TABLET | Refills: 11 | Status: SHIPPED | OUTPATIENT
Start: 2018-04-06 | End: 2019-05-08 | Stop reason: SDUPTHER

## 2018-04-06 NOTE — TELEPHONE ENCOUNTER
----- Message from Davina Maxwell sent at 4/6/2018  9:16 AM CDT -----  Contact: self  Patient [phone] is calling to speak with Nurse as the medication that was called into pharmacy is not covered by her insurance/please call patient to discuss

## 2018-05-03 ENCOUNTER — TELEPHONE (OUTPATIENT)
Dept: CARDIOLOGY | Facility: CLINIC | Age: 58
End: 2018-05-03

## 2018-05-03 NOTE — TELEPHONE ENCOUNTER
Request for Cardiac Clearance    Leena Gibbs  is having: left breast retroareolar ductal excision w/ultrasound guldance.    Patient is currently taking: Plavix    Please advise on clearance for procedure and holding medications.

## 2018-05-18 PROBLEM — N63.42 UNSPECIFIED LUMP IN LEFT BREAST, SUBAREOLAR: Status: ACTIVE | Noted: 2018-05-18

## 2018-07-09 ENCOUNTER — OFFICE VISIT (OUTPATIENT)
Dept: UROLOGY | Facility: CLINIC | Age: 58
End: 2018-07-09
Payer: MEDICARE

## 2018-07-09 VITALS
BODY MASS INDEX: 46.3 KG/M2 | DIASTOLIC BLOOD PRESSURE: 70 MMHG | HEART RATE: 75 BPM | HEIGHT: 64 IN | WEIGHT: 271.19 LBS | SYSTOLIC BLOOD PRESSURE: 140 MMHG

## 2018-07-09 DIAGNOSIS — R35.0 INCREASED URINARY FREQUENCY: Primary | ICD-10-CM

## 2018-07-09 DIAGNOSIS — R39.15 URINARY URGENCY: ICD-10-CM

## 2018-07-09 PROCEDURE — 99213 OFFICE O/P EST LOW 20 MIN: CPT | Mod: PBBFAC,PO | Performed by: UROLOGY

## 2018-07-09 PROCEDURE — 99213 OFFICE O/P EST LOW 20 MIN: CPT | Mod: S$PBB,,, | Performed by: UROLOGY

## 2018-07-09 PROCEDURE — 99999 PR PBB SHADOW E&M-EST. PATIENT-LVL III: CPT | Mod: PBBFAC,,, | Performed by: UROLOGY

## 2018-07-09 RX ORDER — CYCLOBENZAPRINE HCL 5 MG
TABLET ORAL
Refills: 0 | COMMUNITY
Start: 2018-06-30 | End: 2020-02-02 | Stop reason: CLARIF

## 2018-07-09 NOTE — PROGRESS NOTES
Subjective:       Patient ID: Leena Gibbs is a 57 y.o. female.    Chief Complaint: Follow-up    HPI     57 year old with urinary frequency and urgency.  Cystoscopy was unremarkable.  Given a trial of Myrbetriq but unable to afford.  She is now taking Toviaz 8 mg.  Her problems started after stroke in 2006.  She wears CPAP at night and can sleep 8 hours without voiding.  No incontinence at night.  She has daytime frequency.  Take 2 diuretics.  Symptoms are worse when she drinks tea.   PVR 0 ml    Review of Systems   Constitutional: Negative for fever.   Genitourinary: Negative for dysuria and hematuria.       Objective:      Physical Exam   Constitutional: She is oriented to person, place, and time. She appears well-developed and well-nourished.   Pulmonary/Chest: Effort normal. No respiratory distress.   Neurological: She is alert and oriented to person, place, and time.   Skin: Skin is warm.   Psychiatric: She has a normal mood and affect. Her behavior is normal.   Vitals reviewed.      Assessment:       1. Increased urinary frequency    2. Urinary urgency        Plan:       Increased urinary frequency    Urinary urgency      Continue Toviaz.  RTC 12 months

## 2018-09-24 ENCOUNTER — TELEPHONE (OUTPATIENT)
Dept: CARDIOLOGY | Facility: CLINIC | Age: 58
End: 2018-09-24

## 2018-09-24 NOTE — TELEPHONE ENCOUNTER
----- Message from Mikki Doyle sent at 9/24/2018  1:02 PM CDT -----  Pt requesting to speak to nurse about appointment in December / call [phone]

## 2018-09-24 NOTE — TELEPHONE ENCOUNTER
Spoke to pt. Schedule Dr. Doty appointment and echo with labs. Pt. Verbally accepted all appointments.

## 2018-10-08 DIAGNOSIS — I34.0 NON-RHEUMATIC MITRAL REGURGITATION: Chronic | ICD-10-CM

## 2018-10-08 DIAGNOSIS — I10 HTN (HYPERTENSION), MALIGNANT: Chronic | ICD-10-CM

## 2018-10-08 DIAGNOSIS — Z95.5 STENTED CORONARY ARTERY: Chronic | ICD-10-CM

## 2018-10-08 DIAGNOSIS — I25.10 CORONARY ARTERY DISEASE DUE TO LIPID RICH PLAQUE: Chronic | ICD-10-CM

## 2018-10-08 DIAGNOSIS — I71.30 RUPTURED ABDOMINAL AORTIC ANEURYSM (AAA): Chronic | ICD-10-CM

## 2018-10-08 DIAGNOSIS — I70.1 ATHEROSCLEROTIC RAS (RENAL ARTERY STENOSIS), BILATERAL: Chronic | ICD-10-CM

## 2018-10-08 DIAGNOSIS — I25.83 CORONARY ARTERY DISEASE DUE TO LIPID RICH PLAQUE: Chronic | ICD-10-CM

## 2018-10-08 DIAGNOSIS — E78.5 DYSLIPIDEMIA: Chronic | ICD-10-CM

## 2018-10-09 RX ORDER — ROSUVASTATIN CALCIUM 10 MG/1
TABLET, COATED ORAL
Qty: 90 TABLET | Refills: 4 | Status: SHIPPED | OUTPATIENT
Start: 2018-10-09 | End: 2019-03-19 | Stop reason: SDUPTHER

## 2018-10-09 RX ORDER — LISINOPRIL 20 MG/1
TABLET ORAL
Qty: 90 TABLET | Refills: 4 | Status: SHIPPED | OUTPATIENT
Start: 2018-10-09 | End: 2019-03-19

## 2018-10-12 DIAGNOSIS — I10 HTN (HYPERTENSION), MALIGNANT: Primary | Chronic | ICD-10-CM

## 2018-10-16 RX ORDER — CLONIDINE HYDROCHLORIDE 0.1 MG/1
0.1 TABLET ORAL
Qty: 90 TABLET | Refills: 4 | Status: SHIPPED | OUTPATIENT
Start: 2018-10-16 | End: 2019-03-19 | Stop reason: SDUPTHER

## 2018-11-20 ENCOUNTER — TELEPHONE (OUTPATIENT)
Dept: CARDIOLOGY | Facility: CLINIC | Age: 58
End: 2018-11-20

## 2018-11-20 NOTE — TELEPHONE ENCOUNTER
----- Message from Ricarda Hanson sent at 11/20/2018 10:48 AM CST -----  Contact: self [phone]  States that she would like to be worked in for an appt on 12/12/18 @ 1pm with Dr Doty. States that her brother has an appt and she will have to be at appt with him and did not want to have to make another trip. Please call back at [phone]//thank you acc

## 2018-11-26 ENCOUNTER — TELEPHONE (OUTPATIENT)
Dept: CARDIOLOGY | Facility: CLINIC | Age: 58
End: 2018-11-26

## 2018-11-26 NOTE — TELEPHONE ENCOUNTER
Spoke to pt. Informed her of labs order. Pt. Stated she just did labs and will have the results sent to office. I verbalized understanding.

## 2018-11-26 NOTE — TELEPHONE ENCOUNTER
----- Message from Flores Moscoso sent at 11/26/2018 10:26 AM CST -----  Type: Needs Medical Advice    Who Called:  Patient  Best Call Back Number: [phone]  Additional Information: Just had labs on 11/12/18 (fasting). She wants to know if she still have to do the labs on 11/30/18. Please call to advise.

## 2018-11-27 ENCOUNTER — TELEPHONE (OUTPATIENT)
Dept: CARDIOLOGY | Facility: CLINIC | Age: 58
End: 2018-11-27

## 2018-11-27 NOTE — TELEPHONE ENCOUNTER
----- Message from Mikki Doyle sent at 11/27/2018 10:24 AM CST -----  Dr Dorothy Beatty ... Was faxing a copy of labs / she had on11/12 th ... Asking if received / hoping she does not have to repeat the lab / please call pt at [phone] (home)

## 2018-11-27 NOTE — TELEPHONE ENCOUNTER
Spoke to pt. Informed her labs results received but she didn't do the labs Dr. Doty wants. Pt. Stated she will have labs done.

## 2018-11-30 ENCOUNTER — CLINICAL SUPPORT (OUTPATIENT)
Dept: CARDIOLOGY | Facility: CLINIC | Age: 58
End: 2018-11-30
Attending: INTERNAL MEDICINE
Payer: MEDICARE

## 2018-11-30 ENCOUNTER — HOSPITAL ENCOUNTER (OUTPATIENT)
Dept: RADIOLOGY | Facility: HOSPITAL | Age: 58
Discharge: HOME | End: 2018-11-30
Attending: INTERNAL MEDICINE
Payer: MEDICARE

## 2018-11-30 VITALS
WEIGHT: 274 LBS | SYSTOLIC BLOOD PRESSURE: 150 MMHG | BODY MASS INDEX: 46.78 KG/M2 | HEART RATE: 65 BPM | DIASTOLIC BLOOD PRESSURE: 90 MMHG | HEIGHT: 64 IN

## 2018-11-30 DIAGNOSIS — Z95.5 STENTED CORONARY ARTERY: ICD-10-CM

## 2018-11-30 DIAGNOSIS — I70.1 ATHEROSCLEROTIC RAS (RENAL ARTERY STENOSIS), BILATERAL: ICD-10-CM

## 2018-11-30 DIAGNOSIS — M54.50 LUMBAGO: ICD-10-CM

## 2018-11-30 DIAGNOSIS — E78.5 DYSLIPIDEMIA: ICD-10-CM

## 2018-11-30 PROCEDURE — 99212 OFFICE O/P EST SF 10 MIN: CPT | Mod: PBBFAC,25,PO

## 2018-11-30 PROCEDURE — 93306 TTE W/DOPPLER COMPLETE: CPT | Mod: PBBFAC,PO | Performed by: INTERNAL MEDICINE

## 2018-11-30 PROCEDURE — 73502 X-RAY EXAM HIP UNI 2-3 VIEWS: CPT | Mod: 26,RT,, | Performed by: RADIOLOGY

## 2018-11-30 PROCEDURE — 99999 PR PBB SHADOW E&M-EST. PATIENT-LVL II: CPT | Mod: PBBFAC,,,

## 2018-11-30 PROCEDURE — 73502 X-RAY EXAM HIP UNI 2-3 VIEWS: CPT | Mod: TC,FY,PO,RT

## 2018-12-04 LAB
ASCENDING AORTA: 2.71 CM
AV INDEX (PROSTH): 0.63
AV MEAN GRADIENT: 6.09 MMHG
AV PEAK GRADIENT: 10.89 MMHG
AV VALVE AREA: 1.99 CM2
BSA FOR ECHO PROCEDURE: 2.37 M2
CV ECHO LV RWT: 0.5 CM
DOP CALC AO PEAK VEL: 1.65 M/S
DOP CALC AO VTI: 33.41 CM
DOP CALC LVOT AREA: 3.14 CM2
DOP CALC LVOT DIAMETER: 2 CM
DOP CALC LVOT STROKE VOLUME: 66.41 CM3
DOP CALCLVOT PEAK VEL VTI: 21.15 CM
E WAVE DECELERATION TIME: 211.19 MSEC
E/A RATIO: 1.54
E/E' RATIO: 28.91
ECHO LV POSTERIOR WALL: 1.28 CM (ref 0.6–1.1)
FRACTIONAL SHORTENING: 29 % (ref 28–44)
INTERVENTRICULAR SEPTUM: 1.32 CM (ref 0.6–1.1)
IVRT: 0.1 MSEC
LA MAJOR: 4.44 CM
LA MINOR: 5.42 CM
LA WIDTH: 4.78 CM
LEFT ATRIUM SIZE: 5.3 CM
LEFT ATRIUM VOLUME INDEX: 47 ML/M2
LEFT ATRIUM VOLUME: 105.11 CM3
LEFT INTERNAL DIMENSION IN SYSTOLE: 3.68 CM (ref 2.1–4)
LEFT VENTRICLE DIASTOLIC VOLUME INDEX: 56.79 ML/M2
LEFT VENTRICLE DIASTOLIC VOLUME: 126.98 ML
LEFT VENTRICLE MASS INDEX: 123 G/M2
LEFT VENTRICLE SYSTOLIC VOLUME INDEX: 25.7 ML/M2
LEFT VENTRICLE SYSTOLIC VOLUME: 57.41 ML
LEFT VENTRICULAR INTERNAL DIMENSION IN DIASTOLE: 5.16 CM (ref 3.5–6)
LEFT VENTRICULAR MASS: 275.08 G
LV LATERAL E/E' RATIO: 39.75
LV SEPTAL E/E' RATIO: 22.71
MV PEAK A VEL: 1.03 M/S
MV PEAK E VEL: 1.59 M/S
PISA TR MAX VEL: 2.42 M/S
PULM VEIN S/D RATIO: 0.77
PV PEAK D VEL: 0.6 M/S
PV PEAK S VEL: 0.46 M/S
RA MAJOR: 3.95 CM
RA PRESSURE: 3 MMHG
RA WIDTH: 2.29 CM
RIGHT VENTRICULAR END-DIASTOLIC DIMENSION: 3.28 CM
RV TISSUE DOPPLER FREE WALL SYSTOLIC VELOCITY 1 (APICAL 4 CHAMBER VIEW): 10.81 M/S
SINUS: 2.52 CM
STJ: 2.47 CM
TDI LATERAL: 0.04
TDI SEPTAL: 0.07
TDI: 0.06
TR MAX PG: 23.43 MMHG
TRICUSPID ANNULAR PLANE SYSTOLIC EXCURSION: 1.99 CM
TV REST PULMONARY ARTERY PRESSURE: 26.43 MMHG

## 2018-12-12 DIAGNOSIS — I71.40 ABDOMINAL AORTIC ANEURYSM (AAA) WITHOUT RUPTURE: Primary | ICD-10-CM

## 2019-01-24 RX ORDER — ISOSORBIDE MONONITRATE 60 MG/1
60 TABLET, EXTENDED RELEASE ORAL DAILY
Qty: 90 TABLET | Refills: 4 | Status: SHIPPED | OUTPATIENT
Start: 2019-01-24 | End: 2019-03-19 | Stop reason: SDUPTHER

## 2019-02-20 ENCOUNTER — CLINICAL SUPPORT (OUTPATIENT)
Dept: CARDIOLOGY | Facility: CLINIC | Age: 59
End: 2019-02-20
Attending: INTERNAL MEDICINE
Payer: MEDICARE

## 2019-02-20 DIAGNOSIS — I71.40 ABDOMINAL AORTIC ANEURYSM (AAA) WITHOUT RUPTURE: ICD-10-CM

## 2019-02-20 PROCEDURE — 93978 CV US ABDOMINAL AORTA EVALUATION (CUPID ONLY): ICD-10-PCS | Mod: 26,S$PBB,, | Performed by: INTERNAL MEDICINE

## 2019-02-20 PROCEDURE — 93978 VASCULAR STUDY: CPT | Mod: PBBFAC,PO | Performed by: INTERNAL MEDICINE

## 2019-02-21 LAB
ABDOMINAL IMA AP: 1.6 CM
ABDOMINAL IMA PS VEL: 83 CM/S
ABDOMINAL IMA TRANS: 1.8 CM
ABDOMINAL INFRARENAL AORTA AP: 1.6 CM
ABDOMINAL INFRARENAL AORTA PS VEL: 105 CM/S
ABDOMINAL INFRARENAL AORTA TRANS: 2.4 CM
ABDOMINAL JUXTARENAL AORTA AP: 1.7 CM
ABDOMINAL JUXTARENAL AORTA ED VEL: 18 CM/S
ABDOMINAL JUXTARENAL AORTA PS VEL: 74 CM/S
ABDOMINAL JUXTARENAL AORTA TRANS: 2.1 CM
ABDOMINAL LT COM ILIAC AP: 1.1 CM
ABDOMINAL LT COM ILIAC TRANS: 1.8 CM
ABDOMINAL LT COM ILIAC VEL: 107 CM/S
ABDOMINAL RT COM ILIAC AP: 1.3 CM
ABDOMINAL RT COM ILIAC TRANS: 2.1 CM
ABDOMINAL RT COM ILIAC VEL: 90 CM/S
ABDOMINAL SUPRARENAL AORTA AP: 2.2 CM
ABDOMINAL SUPRARENAL AORTA ED VEL: 18 CM/S
ABDOMINAL SUPRARENAL AORTA PS VEL: 60 CM/S
ABDOMINAL SUPRARENAL AORTA TRANS: 2.4 CM

## 2019-02-25 ENCOUNTER — TELEPHONE (OUTPATIENT)
Dept: CARDIOLOGY | Facility: CLINIC | Age: 59
End: 2019-02-25

## 2019-02-25 NOTE — TELEPHONE ENCOUNTER
----- Message from Alyssa Thompson sent at 2/25/2019 11:30 AM CST -----  Contact: Patient  Type:  Patient Returning Call    Who Called:  Patient  Who Left Message for Patient:  Sandi  Does the patient know what this is regarding?:  Ultrasound results  Best Call Back Number:  [phone] (home)    Additional Information:  na

## 2019-03-07 DIAGNOSIS — I10 HTN (HYPERTENSION), MALIGNANT: Primary | Chronic | ICD-10-CM

## 2019-03-07 RX ORDER — TRIAMTERENE/HYDROCHLOROTHIAZID 37.5-25 MG
1 TABLET ORAL DAILY
Qty: 90 TABLET | Refills: 4 | Status: SHIPPED | OUTPATIENT
Start: 2019-03-07 | End: 2019-03-19 | Stop reason: SDUPTHER

## 2019-03-19 ENCOUNTER — OFFICE VISIT (OUTPATIENT)
Dept: CARDIOLOGY | Facility: CLINIC | Age: 59
End: 2019-03-19
Payer: MEDICARE

## 2019-03-19 ENCOUNTER — TELEPHONE (OUTPATIENT)
Dept: CARDIOLOGY | Facility: CLINIC | Age: 59
End: 2019-03-19

## 2019-03-19 VITALS
HEART RATE: 70 BPM | BODY MASS INDEX: 48.22 KG/M2 | DIASTOLIC BLOOD PRESSURE: 93 MMHG | WEIGHT: 282.44 LBS | SYSTOLIC BLOOD PRESSURE: 132 MMHG | HEIGHT: 64 IN

## 2019-03-19 DIAGNOSIS — I34.0 NON-RHEUMATIC MITRAL REGURGITATION: Chronic | ICD-10-CM

## 2019-03-19 DIAGNOSIS — I25.10 CORONARY ARTERY DISEASE INVOLVING NATIVE CORONARY ARTERY OF NATIVE HEART WITHOUT ANGINA PECTORIS: Chronic | ICD-10-CM

## 2019-03-19 DIAGNOSIS — I10 HTN (HYPERTENSION), MALIGNANT: Chronic | ICD-10-CM

## 2019-03-19 DIAGNOSIS — I34.0 MITRAL VALVE INSUFFICIENCY, UNSPECIFIED ETIOLOGY: ICD-10-CM

## 2019-03-19 DIAGNOSIS — I71.30 RUPTURED ABDOMINAL AORTIC ANEURYSM (AAA): Chronic | ICD-10-CM

## 2019-03-19 DIAGNOSIS — I25.10 CORONARY ARTERY DISEASE DUE TO LIPID RICH PLAQUE: Chronic | ICD-10-CM

## 2019-03-19 DIAGNOSIS — I25.83 CORONARY ARTERY DISEASE DUE TO LIPID RICH PLAQUE: Chronic | ICD-10-CM

## 2019-03-19 DIAGNOSIS — I71.40 ABDOMINAL AORTIC ANEURYSM WITHOUT RUPTURE: Chronic | ICD-10-CM

## 2019-03-19 DIAGNOSIS — Z95.5 STENTED CORONARY ARTERY: Primary | Chronic | ICD-10-CM

## 2019-03-19 DIAGNOSIS — I71.40 ABDOMINAL AORTIC ANEURYSM (AAA) WITHOUT RUPTURE: Chronic | ICD-10-CM

## 2019-03-19 DIAGNOSIS — I70.1 ATHEROSCLEROTIC RAS (RENAL ARTERY STENOSIS), BILATERAL: Chronic | ICD-10-CM

## 2019-03-19 DIAGNOSIS — E78.5 DYSLIPIDEMIA: Chronic | ICD-10-CM

## 2019-03-19 PROCEDURE — 99999 PR PBB SHADOW E&M-EST. PATIENT-LVL III: CPT | Mod: PBBFAC,,, | Performed by: INTERNAL MEDICINE

## 2019-03-19 PROCEDURE — 99213 OFFICE O/P EST LOW 20 MIN: CPT | Mod: PBBFAC,PO | Performed by: INTERNAL MEDICINE

## 2019-03-19 PROCEDURE — 99999 PR PBB SHADOW E&M-EST. PATIENT-LVL III: ICD-10-PCS | Mod: PBBFAC,,, | Performed by: INTERNAL MEDICINE

## 2019-03-19 PROCEDURE — 99214 PR OFFICE/OUTPT VISIT, EST, LEVL IV, 30-39 MIN: ICD-10-PCS | Mod: S$PBB,,, | Performed by: INTERNAL MEDICINE

## 2019-03-19 PROCEDURE — 99214 OFFICE O/P EST MOD 30 MIN: CPT | Mod: S$PBB,,, | Performed by: INTERNAL MEDICINE

## 2019-03-19 RX ORDER — CLOPIDOGREL BISULFATE 75 MG/1
75 TABLET ORAL
Qty: 90 TABLET | Refills: 5 | Status: ON HOLD | OUTPATIENT
Start: 2019-03-20 | End: 2020-02-05 | Stop reason: HOSPADM

## 2019-03-19 RX ORDER — TRIAMTERENE/HYDROCHLOROTHIAZID 37.5-25 MG
1 TABLET ORAL DAILY
Qty: 90 TABLET | Refills: 4 | Status: ON HOLD | OUTPATIENT
Start: 2019-03-19 | End: 2020-02-05 | Stop reason: HOSPADM

## 2019-03-19 RX ORDER — AMLODIPINE BESYLATE 10 MG/1
10 TABLET ORAL NIGHTLY
Qty: 90 TABLET | Refills: 5 | Status: SHIPPED | OUTPATIENT
Start: 2019-03-19 | End: 2019-08-15

## 2019-03-19 RX ORDER — CARVEDILOL 25 MG/1
25 TABLET ORAL 2 TIMES DAILY WITH MEALS
Qty: 180 TABLET | Refills: 4 | Status: SHIPPED | OUTPATIENT
Start: 2019-03-19 | End: 2020-04-06

## 2019-03-19 RX ORDER — ROSUVASTATIN CALCIUM 20 MG/1
20 TABLET, COATED ORAL NIGHTLY
Qty: 90 TABLET | Refills: 3 | Status: SHIPPED | OUTPATIENT
Start: 2019-03-19 | End: 2020-03-13

## 2019-03-19 RX ORDER — ISOSORBIDE MONONITRATE 60 MG/1
60 TABLET, EXTENDED RELEASE ORAL DAILY
Qty: 90 TABLET | Refills: 4 | Status: ON HOLD | OUTPATIENT
Start: 2019-03-19 | End: 2020-02-05 | Stop reason: HOSPADM

## 2019-03-19 RX ORDER — CLONIDINE HYDROCHLORIDE 0.1 MG/1
0.1 TABLET ORAL
Qty: 90 TABLET | Refills: 4 | Status: ON HOLD | OUTPATIENT
Start: 2019-03-19 | End: 2020-02-05 | Stop reason: HOSPADM

## 2019-03-19 NOTE — PROGRESS NOTES
Subjective:    Patient ID:  Leena Gibbs is a 58 y.o. female who presents for follow-up of No chief complaint on file.      HPI  Ms. Gibbs here for follow up of resistant HTN/small AAA/MR.  Remains active. No CP. Has been out of meds.         Review of Systems   Constitution: Negative for malaise/fatigue.   Eyes: Negative for blurred vision.   Cardiovascular: Negative for chest pain, claudication, cyanosis, dyspnea on exertion, irregular heartbeat, leg swelling, near-syncope, orthopnea, palpitations, paroxysmal nocturnal dyspnea and syncope.   Respiratory: Negative for cough and shortness of breath.    Hematologic/Lymphatic: Does not bruise/bleed easily.   Musculoskeletal: Negative for back pain, falls, joint pain, muscle cramps, muscle weakness and myalgias.   Gastrointestinal: Negative for abdominal pain, change in bowel habit, nausea and vomiting.   Genitourinary: Negative for urgency.   Neurological: Negative for dizziness, focal weakness and light-headedness.        Objective:    Physical Exam   Constitutional: She is oriented to person, place, and time. She appears well-developed and well-nourished.   Neck: Normal range of motion. No JVD present.   Cardiovascular: Normal rate, regular rhythm, normal heart sounds and intact distal pulses.   Pulmonary/Chest: Effort normal and breath sounds normal.   Neurological: She is alert and oriented to person, place, and time.   Skin: Skin is warm and dry.   Psychiatric: She has a normal mood and affect.               ..    Chemistry        Component Value Date/Time     11/30/2018 1108    K 4.2 11/30/2018 1108     11/30/2018 1108    CO2 24 11/30/2018 1108    BUN 30 (H) 11/30/2018 1108    CREATININE 1.7 (H) 11/30/2018 1108    GLU 91 11/30/2018 1108        Component Value Date/Time    CALCIUM 9.7 11/30/2018 1108    ALKPHOS 63 11/30/2018 1108    AST 20 11/30/2018 1108    AST 18 11/28/2015 1922    ALT 11 11/30/2018 1108    BILITOT 0.3 11/30/2018 1108     ESTGFRAFRICA 37.8 (A) 11/30/2018 1108    EGFRNONAA 32.8 (A) 11/30/2018 1108            ..  Lab Results   Component Value Date    CHOL 288 (H) 11/30/2018    CHOL 191 03/12/2018    CHOL 257 (H) 02/24/2017     Lab Results   Component Value Date    HDL 38 (L) 11/30/2018    HDL 44 03/12/2018    HDL 35 (L) 02/24/2017     Lab Results   Component Value Date    LDLCALC 208.6 (H) 11/30/2018    LDLCALC 128.8 03/12/2018    LDLCALC 178.2 (H) 02/24/2017     Lab Results   Component Value Date    TRIG 207 (H) 11/30/2018    TRIG 91 03/12/2018    TRIG 219 (H) 02/24/2017     Lab Results   Component Value Date    CHOLHDL 13.2 (L) 11/30/2018    CHOLHDL 23.0 03/12/2018    CHOLHDL 13.6 (L) 02/24/2017     ..  Lab Results   Component Value Date    WBC 5.00 04/20/2018    HGB 13.0 05/11/2018    HCT 44.1 04/20/2018    MCV 90 04/20/2018     04/20/2018       Test(s) Reviewed  I have reviewed the following in detail:  [] Stress test   [] Angiography   [x] Echocardiogram   [x] Labs   [x] Other:       Assessment:         ICD-10-CM ICD-9-CM   1. Stented coronary artery Z95.5 V45.82   2. Dyslipidemia E78.5 272.4   3. Non-rheumatic mitral regurgitation I34.0 424.0   4. HTN (hypertension), malignant I10 401.0   5. Atherosclerotic DEJA (renal artery stenosis), bilateral I70.1 440.1   6. Abdominal aortic aneurysm (AAA) without rupture I71.4 441.4     Problem List Items Addressed This Visit     Stented coronary artery - Primary (Chronic)    Overview     2/2015 OM2- promus 2.75x30         Mitral regurgitation (Chronic)    Overview     17/13 mod/sev         HTN (hypertension), malignant (Chronic)    Overview     11/15/2010 bilateral Renal, 10% stenosis         Dyslipidemia (Chronic)    Atherosclerotic DEJA (renal artery stenosis), bilateral (Chronic)    Overview     2/2015 Rt renal- 40%, Lt renal - 30%         AAA (abdominal aortic aneurysm) (Chronic)    Overview     1/2014  Findings:  Real-time, color flow and Doppler imaging of the aorta was  performed.  Atherosclerotic plaque identified within the distal aorta and proximal common iliac arteries. There is mild saccular aneurysmal dilatation/ focal ectasia of the distal abdominal aorta.  Proximal aorta: 3.1 cm AP x 2.9 cm TRV.   Mid aorta: 2.0 cm x 2.3 cm.  Distal aorta: 3.4 cm x 2.6 cm. (3.0 cm in length)  Proximal common iliac arteries measured 1.3 cm and 1.1 cm maximum diameter on the right and left respectively.      Result Impression    1. Atherosclerotic plaque and mild ectasia/saccular aneurysmal dilatation of the distal abd aorta.  2. No other cystic findings.  3. See discussion above. Further evaluation and/or follow-up imaging as warranted.   Electronically signed by: MARRY MARCUS III, MD       1/12 3.3 cm                Plan:               Return to clinic 8 months   Aerobic exercise 5x's/wk. Heart healthy diet and risk factor modification.    See labs and med orders.  Refill all meds. Take ypur meds  CFD next visit due to MR    Portions of this note may have been created with voice recognition software.  Grammatical, syntax and spelling errors may be inevitable.

## 2019-03-19 NOTE — TELEPHONE ENCOUNTER
----- Message from Davina Maxwell sent at 3/19/2019 11:59 AM CDT -----  Contact: Parish Lugo with [facility] [phone] is calling to clarify the dose on Plavix as the directions state to take 3 times a week/please call    [facility] - [place] [address] [address]  Phone: [phone] Fax: [phone]

## 2019-05-08 DIAGNOSIS — R39.15 URINARY URGENCY: ICD-10-CM

## 2019-05-08 DIAGNOSIS — N32.81 OAB (OVERACTIVE BLADDER): ICD-10-CM

## 2019-05-09 RX ORDER — FESOTERODINE FUMARATE 8 MG/1
TABLET, FILM COATED, EXTENDED RELEASE ORAL
Qty: 30 TABLET | Refills: 11 | Status: SHIPPED | OUTPATIENT
Start: 2019-05-09 | End: 2020-05-06

## 2019-08-15 ENCOUNTER — OFFICE VISIT (OUTPATIENT)
Dept: CARDIOLOGY | Facility: CLINIC | Age: 59
End: 2019-08-15
Payer: MEDICARE

## 2019-08-15 VITALS
SYSTOLIC BLOOD PRESSURE: 163 MMHG | WEIGHT: 278.69 LBS | HEART RATE: 76 BPM | BODY MASS INDEX: 47.58 KG/M2 | HEIGHT: 64 IN | DIASTOLIC BLOOD PRESSURE: 109 MMHG

## 2019-08-15 DIAGNOSIS — I25.10 CORONARY ARTERY DISEASE INVOLVING NATIVE CORONARY ARTERY OF NATIVE HEART WITHOUT ANGINA PECTORIS: Chronic | ICD-10-CM

## 2019-08-15 DIAGNOSIS — I70.1 ATHEROSCLEROTIC RAS (RENAL ARTERY STENOSIS), BILATERAL: Chronic | ICD-10-CM

## 2019-08-15 DIAGNOSIS — I71.40 ABDOMINAL AORTIC ANEURYSM (AAA) WITHOUT RUPTURE: Chronic | ICD-10-CM

## 2019-08-15 DIAGNOSIS — I34.0 NON-RHEUMATIC MITRAL REGURGITATION: Chronic | ICD-10-CM

## 2019-08-15 DIAGNOSIS — I10 HTN (HYPERTENSION), MALIGNANT: Chronic | ICD-10-CM

## 2019-08-15 DIAGNOSIS — Z95.5 STENTED CORONARY ARTERY: Primary | Chronic | ICD-10-CM

## 2019-08-15 PROCEDURE — 99999 PR PBB SHADOW E&M-EST. PATIENT-LVL III: ICD-10-PCS | Mod: PBBFAC,,, | Performed by: INTERNAL MEDICINE

## 2019-08-15 PROCEDURE — 99214 PR OFFICE/OUTPT VISIT, EST, LEVL IV, 30-39 MIN: ICD-10-PCS | Mod: S$PBB,,, | Performed by: INTERNAL MEDICINE

## 2019-08-15 PROCEDURE — 99214 OFFICE O/P EST MOD 30 MIN: CPT | Mod: S$PBB,,, | Performed by: INTERNAL MEDICINE

## 2019-08-15 PROCEDURE — 99999 PR PBB SHADOW E&M-EST. PATIENT-LVL III: CPT | Mod: PBBFAC,,, | Performed by: INTERNAL MEDICINE

## 2019-08-15 PROCEDURE — 99213 OFFICE O/P EST LOW 20 MIN: CPT | Mod: PBBFAC,PO | Performed by: INTERNAL MEDICINE

## 2019-08-15 RX ORDER — HYDRALAZINE HYDROCHLORIDE 25 MG/1
100 TABLET, FILM COATED ORAL EVERY 12 HOURS
Status: DISCONTINUED | OUTPATIENT
Start: 2019-08-15 | End: 2019-08-15

## 2019-08-15 RX ORDER — AMLODIPINE BESYLATE 10 MG/1
10 TABLET ORAL NIGHTLY
Qty: 90 TABLET | Refills: 5 | Status: ON HOLD | OUTPATIENT
Start: 2019-08-15 | End: 2020-02-05 | Stop reason: HOSPADM

## 2019-08-15 RX ORDER — HYDRALAZINE HYDROCHLORIDE 100 MG/1
100 TABLET, FILM COATED ORAL 2 TIMES DAILY
Qty: 180 TABLET | Refills: 5 | Status: SHIPPED | OUTPATIENT
Start: 2019-08-15 | End: 2020-02-02 | Stop reason: CLARIF

## 2019-08-15 NOTE — PROGRESS NOTES
Subjective:    Patient ID:  Leena Gibbs is a 59 y.o. female who presents for follow-up of Hypertension f/u      HPI  Ms. Gibbs here for follow up of resistant HTN/small AAA/MR. Bp not controled c/o frothy sputum with heavy activity. No CP.  Admits to increase salt use.     Review of Systems   Constitution: Negative for malaise/fatigue.   Eyes: Negative for blurred vision.   Cardiovascular: Negative for chest pain, claudication, cyanosis, dyspnea on exertion, irregular heartbeat, leg swelling, near-syncope, orthopnea, palpitations, paroxysmal nocturnal dyspnea and syncope.   Respiratory: Negative for cough and shortness of breath.    Hematologic/Lymphatic: Does not bruise/bleed easily.   Musculoskeletal: Negative for back pain, falls, joint pain, muscle cramps, muscle weakness and myalgias.   Gastrointestinal: Negative for abdominal pain, change in bowel habit, nausea and vomiting.   Genitourinary: Negative for urgency.   Neurological: Negative for dizziness, focal weakness and light-headedness.       Past Medical History:   Diagnosis Date    AAA (abdominal aortic aneurysm)     Anticoagulant long-term use     plavix    Arthritis     Atherosclerotic DEJA (renal artery stenosis), bilateral 3/27/2015    2/2015 Rt renal- 40%, Lt renal - 30%     Blindness of left eye     CAD (coronary artery disease) 3/27/2015    2/2015 LAD- 50% mid and distal, Cx- 20%, OM2 80%, smll branch 60%, RCA- non-dom 30%     CKD (chronic kidney disease) 1/17/2012    Dr Beatty     Colon polyp     Coronary artery disease     CVA (cerebral infarction)     x2; 12/27/2006 & 1/18/2010    Diverticulosis     DM (diabetes mellitus)     Dyslipidemia 1/17/2012    Dyspnea on exertion     Gout     HTN (hypertension), malignant 1/17/2012    11/15/2010 bilateral Renal, 10% stenosis     Hyperlipidemia     Hypertension     Kidney disease     stage 3, sees nephrologist Dr Beatty    Mitral regurgitation 1/17/2012 17/13  mod/sev     Mitral valve regurgitation     PONV (postoperative nausea and vomiting)     Renal failure     stage 3, per patient.  Sees Dr Dorothy Beatty    Sleep apnea with use of continuous positive airway pressure (CPAP)     Stented coronary artery 3/27/2015    2/2015 OM2- promus 2.75x30     Stroke 2006 / 2010    does not use any assistive devices          Objective:     Vitals:    08/15/19 1101   BP: (!) 163/109   Pulse: 76        Physical Exam   Constitutional: She is oriented to person, place, and time. She appears well-developed and well-nourished.   Neck: Normal range of motion. No JVD present.   Cardiovascular: Normal rate, regular rhythm, normal heart sounds and intact distal pulses.   Pulmonary/Chest: Effort normal and breath sounds normal.   Neurological: She is alert and oriented to person, place, and time.   Skin: Skin is warm and dry.   Psychiatric: She has a normal mood and affect.             ..    Chemistry        Component Value Date/Time     11/30/2018 1108    K 4.2 11/30/2018 1108     11/30/2018 1108    CO2 24 11/30/2018 1108    BUN 30 (H) 11/30/2018 1108    CREATININE 1.7 (H) 11/30/2018 1108    GLU 91 11/30/2018 1108        Component Value Date/Time    CALCIUM 9.7 11/30/2018 1108    ALKPHOS 63 11/30/2018 1108    AST 20 11/30/2018 1108    AST 18 11/28/2015 1922    ALT 11 11/30/2018 1108    BILITOT 0.3 11/30/2018 1108    ESTGFRAFRICA 37.8 (A) 11/30/2018 1108    EGFRNONAA 32.8 (A) 11/30/2018 1108            ..  Lab Results   Component Value Date    CHOL 288 (H) 11/30/2018    CHOL 191 03/12/2018    CHOL 257 (H) 02/24/2017     Lab Results   Component Value Date    HDL 38 (L) 11/30/2018    HDL 44 03/12/2018    HDL 35 (L) 02/24/2017     Lab Results   Component Value Date    LDLCALC 208.6 (H) 11/30/2018    LDLCALC 128.8 03/12/2018    LDLCALC 178.2 (H) 02/24/2017     Lab Results   Component Value Date    TRIG 207 (H) 11/30/2018    TRIG 91 03/12/2018    TRIG 219 (H) 02/24/2017     Lab  Results   Component Value Date    CHOLHDL 13.2 (L) 11/30/2018    CHOLHDL 23.0 03/12/2018    CHOLHDL 13.6 (L) 02/24/2017     ..  Lab Results   Component Value Date    WBC 5.00 04/20/2018    HGB 13.0 05/11/2018    HCT 44.1 04/20/2018    MCV 90 04/20/2018     04/20/2018       Test(s) Reviewed  I have reviewed the following in detail:  [] Stress test   [] Angiography   [] Echocardiogram   [x] Labs   [x] Other:       Assessment:         ICD-10-CM ICD-9-CM   1. Stented coronary artery Z95.5 V45.82   2. Non-rheumatic mitral regurgitation I34.0 424.0   3. HTN (hypertension), malignant I10 401.0   4. Atherosclerotic DEJA (renal artery stenosis), bilateral I70.1 440.1   5. Coronary artery disease involving native coronary artery of native heart without angina pectoris I25.10 414.01   6. Abdominal aortic aneurysm (AAA) without rupture I71.4 441.4     Problem List Items Addressed This Visit     Stented coronary artery - Primary (Chronic)    Overview     2/2015 OM2- promus 2.75x30         Mitral regurgitation (Chronic)    Overview     17/13 mod/sev         HTN (hypertension), malignant (Chronic)    Overview     11/15/2010 bilateral Renal, 10% stenosis         CAD (coronary artery disease) (Chronic)    Overview     2/2015 LAD- 50% mid and distal, Cx- 20%, OM2 80%, smll branch 60%, RCA- non-dom 30%         Atherosclerotic DEJA (renal artery stenosis), bilateral (Chronic)    Overview     2/2015 Rt renal- 40%, Lt renal - 30%         AAA (abdominal aortic aneurysm) (Chronic)    Overview     1/2014  Findings:  Real-time, color flow and Doppler imaging of the aorta was performed.  Atherosclerotic plaque identified within the distal aorta and proximal common iliac arteries. There is mild saccular aneurysmal dilatation/ focal ectasia of the distal abdominal aorta.  Proximal aorta: 3.1 cm AP x 2.9 cm TRV.   Mid aorta: 2.0 cm x 2.3 cm.  Distal aorta: 3.4 cm x 2.6 cm. (3.0 cm in length)  Proximal common iliac arteries measured 1.3 cm  and 1.1 cm maximum diameter on the right and left respectively.      Result Impression    1. Atherosclerotic plaque and mild ectasia/saccular aneurysmal dilatation of the distal abd aorta.  2. No other cystic findings.  3. See discussion above. Further evaluation and/or follow-up imaging as warranted.   Electronically signed by: MARRY MARCUS III, MD       1/12 3.3 cm                Plan:           Return to clinic 6 months   Low level/low impact aerobic exercise 5x's/wk. Heart healthy diet and risk factor modification.    See labs and med orders.  Add hydralzine 100 bid.   Blood pressure log at home (different times a day) bring log and machine here next visit.  Orders Placed This Encounter   Procedures    Comprehensive metabolic panel    Brain natriuretic peptide    Lipid panel    CBC auto differential    TSH    CV Ultrasound renal artery (Cupid Only)    Transthoracic echo (TTE) 2D with Color Flow     See PA in 4 weeks    Portions of this note may have been created with voice recognition software.  Grammatical, syntax and spelling errors may be inevitable.

## 2019-08-23 ENCOUNTER — CLINICAL SUPPORT (OUTPATIENT)
Dept: CARDIOLOGY | Facility: CLINIC | Age: 59
End: 2019-08-23
Attending: INTERNAL MEDICINE
Payer: MEDICARE

## 2019-08-23 ENCOUNTER — LAB VISIT (OUTPATIENT)
Dept: LAB | Facility: HOSPITAL | Age: 59
End: 2019-08-23
Attending: INTERNAL MEDICINE
Payer: MEDICARE

## 2019-08-23 DIAGNOSIS — I71.40 ABDOMINAL AORTIC ANEURYSM (AAA) WITHOUT RUPTURE: ICD-10-CM

## 2019-08-23 DIAGNOSIS — I70.1 ATHEROSCLEROTIC RAS (RENAL ARTERY STENOSIS), BILATERAL: Chronic | ICD-10-CM

## 2019-08-23 DIAGNOSIS — I34.0 NON-RHEUMATIC MITRAL REGURGITATION: ICD-10-CM

## 2019-08-23 DIAGNOSIS — I10 HTN (HYPERTENSION), MALIGNANT: ICD-10-CM

## 2019-08-23 DIAGNOSIS — I34.0 NON-RHEUMATIC MITRAL REGURGITATION: Chronic | ICD-10-CM

## 2019-08-23 DIAGNOSIS — Z95.5 STENTED CORONARY ARTERY: Chronic | ICD-10-CM

## 2019-08-23 DIAGNOSIS — I71.40 ABDOMINAL AORTIC ANEURYSM (AAA) WITHOUT RUPTURE: Chronic | ICD-10-CM

## 2019-08-23 DIAGNOSIS — Z95.5 STENTED CORONARY ARTERY: ICD-10-CM

## 2019-08-23 DIAGNOSIS — I25.10 CORONARY ARTERY DISEASE INVOLVING NATIVE CORONARY ARTERY OF NATIVE HEART WITHOUT ANGINA PECTORIS: ICD-10-CM

## 2019-08-23 DIAGNOSIS — I70.1 ATHEROSCLEROTIC RAS (RENAL ARTERY STENOSIS), BILATERAL: ICD-10-CM

## 2019-08-23 DIAGNOSIS — I10 HTN (HYPERTENSION), MALIGNANT: Chronic | ICD-10-CM

## 2019-08-23 DIAGNOSIS — I25.10 CORONARY ARTERY DISEASE INVOLVING NATIVE CORONARY ARTERY OF NATIVE HEART WITHOUT ANGINA PECTORIS: Chronic | ICD-10-CM

## 2019-08-23 LAB
ABDOMINAL AORTA PROX EDV: 16 CM/S
ABDOMINAL AORTA PROX PSV: 62 CM/S
ALBUMIN SERPL BCP-MCNC: 3.6 G/DL (ref 3.5–5.2)
ALP SERPL-CCNC: 62 U/L (ref 55–135)
ALT SERPL W/O P-5'-P-CCNC: 10 U/L (ref 10–44)
ANION GAP SERPL CALC-SCNC: 8 MMOL/L (ref 8–16)
ASCENDING AORTA: 2.74 CM
AST SERPL-CCNC: 14 U/L (ref 10–40)
AV INDEX (PROSTH): 0.6
AV MEAN GRADIENT: 7 MMHG
AV PEAK GRADIENT: 13 MMHG
AV VALVE AREA: 1.7 CM2
AV VELOCITY RATIO: 0.58
BASOPHILS # BLD AUTO: 0.1 K/UL (ref 0–0.2)
BASOPHILS NFR BLD: 1.8 % (ref 0–1.9)
BILIRUB SERPL-MCNC: 0.3 MG/DL (ref 0.1–1)
BNP SERPL-MCNC: 42 PG/ML (ref 0–99)
BUN SERPL-MCNC: 39 MG/DL (ref 6–20)
CALCIUM SERPL-MCNC: 10.2 MG/DL (ref 8.7–10.5)
CHLORIDE SERPL-SCNC: 107 MMOL/L (ref 95–110)
CHOLEST SERPL-MCNC: 213 MG/DL (ref 120–199)
CHOLEST/HDLC SERPL: 6.3 {RATIO} (ref 2–5)
CO2 SERPL-SCNC: 26 MMOL/L (ref 23–29)
CREAT SERPL-MCNC: 2.6 MG/DL (ref 0.5–1.4)
CV ECHO LV RWT: 0.38 CM
DIFFERENTIAL METHOD: ABNORMAL
DOP CALC AO PEAK VEL: 1.78 M/S
DOP CALC AO VTI: 36.57 CM
DOP CALC LVOT AREA: 2.8 CM2
DOP CALC LVOT DIAMETER: 1.9 CM
DOP CALC LVOT PEAK VEL: 1.03 M/S
DOP CALC LVOT STROKE VOLUME: 62.17 CM3
DOP CALCLVOT PEAK VEL VTI: 21.94 CM
E WAVE DECELERATION TIME: 161.26 MSEC
E/A RATIO: 0.89
ECHO LV POSTERIOR WALL: 1.13 CM (ref 0.6–1.1)
EOSINOPHIL # BLD AUTO: 0.1 K/UL (ref 0–0.5)
EOSINOPHIL NFR BLD: 2.6 % (ref 0–8)
ERYTHROCYTE [DISTWIDTH] IN BLOOD BY AUTOMATED COUNT: 14.1 % (ref 11.5–14.5)
EST. GFR  (AFRICAN AMERICAN): 22.4 ML/MIN/1.73 M^2
EST. GFR  (NON AFRICAN AMERICAN): 19.5 ML/MIN/1.73 M^2
FRACTIONAL SHORTENING: 50 % (ref 28–44)
GLUCOSE SERPL-MCNC: 92 MG/DL (ref 70–110)
HCT VFR BLD AUTO: 44.1 % (ref 37–48.5)
HDLC SERPL-MCNC: 34 MG/DL (ref 40–75)
HDLC SERPL: 16 % (ref 20–50)
HGB BLD-MCNC: 13.5 G/DL (ref 12–16)
IMM GRANULOCYTES # BLD AUTO: 0.01 K/UL (ref 0–0.04)
IMM GRANULOCYTES NFR BLD AUTO: 0.2 % (ref 0–0.5)
INTERVENTRICULAR SEPTUM: 1.16 CM (ref 0.6–1.1)
IVRT: 0.11 MSEC
LA MAJOR: 5.21 CM
LA MINOR: 6.48 CM
LA WIDTH: 5.51 CM
LDLC SERPL CALC-MCNC: 135.6 MG/DL (ref 63–159)
LEFT ATRIUM SIZE: 4.9 CM
LEFT ATRIUM VOLUME: 132.55 CM3
LEFT INTERNAL DIMENSION IN SYSTOLE: 2.97 CM (ref 2.1–4)
LEFT RENAL DIST DIAS: 13 CM/S
LEFT RENAL DIST SYS: 36 CM/S
LEFT RENAL MID DIAS: 12 CM/S
LEFT RENAL MID SYS: 47 CM/S
LEFT RENAL ORIGIN DIAS: 20 CM/S
LEFT RENAL ORIGIN SYS: 69 CM/S
LEFT RENAL PROX DIAS: 23 CM/S
LEFT RENAL PROX RAR: 1.34
LEFT RENAL PROX SYS: 83 CM/S
LEFT RENAL ULTRASOUND ACCELERATION TIME MEASUREMENT 1: 45 MS
LEFT RENAL ULTRASOUND ACCELERATION TIME MEASUREMENT 2: 35 MS
LEFT RENAL ULTRASOUND ACCELERATION TIME MEASUREMENT 3: 24 MS
LEFT RENAL ULTRASOUND ACCELERATION TIME MEASUREMENT AVERAGE: 45 MS
LEFT RENAL ULTRASOUND KIDNEY SIZE MEASUREMENT 1: 10.09 CM
LEFT RENAL ULTRASOUND KIDNEY SIZE MEASUREMENT 2: 10.6 CM
LEFT RENAL ULTRASOUND KIDNEY SIZE MEASUREMENT 3: 10.07 CM
LEFT RENAL ULTRASOUND KIDNEY SIZE MEASUREMENT AVERAGE: 10.6 CM
LEFT RENAL ULTRASOUND RESISTIVE INDEX MEASUREMENT 1: 0.63
LEFT RENAL ULTRASOUND RESISTIVE INDEX MEASUREMENT 2: 0.51
LEFT RENAL ULTRASOUND RESISTIVE INDEX MEASUREMENT 3: 0.63
LEFT RENAL ULTRASOUND RESISTIVE INDEX MEASUREMENT AVERAGE: 0.63
LEFT VENTRICLE DIASTOLIC VOLUME: 172.52 ML
LEFT VENTRICLE SYSTOLIC VOLUME: 34.06 ML
LEFT VENTRICULAR INTERNAL DIMENSION IN DIASTOLE: 5.89 CM (ref 3.5–6)
LEFT VENTRICULAR MASS: 285.98 G
LV SEPTAL E/E' RATIO: 29.25 M/S
LYMPHOCYTES # BLD AUTO: 2.3 K/UL (ref 1–4.8)
LYMPHOCYTES NFR BLD: 42.2 % (ref 18–48)
MCH RBC QN AUTO: 27.3 PG (ref 27–31)
MCHC RBC AUTO-ENTMCNC: 30.6 G/DL (ref 32–36)
MCV RBC AUTO: 89 FL (ref 82–98)
MONOCYTES # BLD AUTO: 0.6 K/UL (ref 0.3–1)
MONOCYTES NFR BLD: 10.8 % (ref 4–15)
MV PEAK A VEL: 1.32 M/S
MV PEAK E VEL: 1.17 M/S
NEUTROPHILS # BLD AUTO: 2.3 K/UL (ref 1.8–7.7)
NEUTROPHILS NFR BLD: 42.4 % (ref 38–73)
NONHDLC SERPL-MCNC: 179 MG/DL
NRBC BLD-RTO: 0 /100 WBC
OHS CV LEFT RENAL RAR: 1.34
OHS CV RIGHT RENAL RAR: 2.61
OHS CV US LEFT RENAL HIGHEST EDV: 23
OHS CV US LEFT RENAL HIGHEST PSV: 83
OHS CV US RIGHT RENAL HIGHEST EDV: 36
OHS CV US RIGHT RENAL HIGHEST PSV: 162
PISA TR MAX VEL: 2.51 M/S
PLATELET # BLD AUTO: 285 K/UL (ref 150–350)
PMV BLD AUTO: 12.3 FL (ref 9.2–12.9)
POTASSIUM SERPL-SCNC: 4.5 MMOL/L (ref 3.5–5.1)
PROT SERPL-MCNC: 8 G/DL (ref 6–8.4)
RA MAJOR: 4.14 CM
RA PRESSURE: 3 MMHG
RA WIDTH: 3.48 CM
RBC # BLD AUTO: 4.94 M/UL (ref 4–5.4)
RIGHT RENAL DIST DIAS: 14 CM/S
RIGHT RENAL DIST SYS: 50 CM/S
RIGHT RENAL MID DIAS: 24 CM/S
RIGHT RENAL MID SYS: 116 CM/S
RIGHT RENAL ORIGIN DIAS: 36 CM/S
RIGHT RENAL ORIGIN SYS: 130 CM/S
RIGHT RENAL PROX DIAS: 33 CM/S
RIGHT RENAL PROX RAR: 2.61
RIGHT RENAL PROX SYS: 162 CM/S
RIGHT RENAL ULTRASOUND ACCELERATION TIME MEASUREMENT 1: 93 MS
RIGHT RENAL ULTRASOUND ACCELERATION TIME MEASUREMENT 2: 120 MS
RIGHT RENAL ULTRASOUND ACCELERATION TIME MEASUREMENT 3: 107 MS
RIGHT RENAL ULTRASOUND ACCELERATION TIME MEASUREMENT AVERAGE: 120 MS
RIGHT RENAL ULTRASOUND KIDNEY SIZE MEASUREMENT 1: 10.1 CM
RIGHT RENAL ULTRASOUND KIDNEY SIZE MEASUREMENT 2: 9.9 CM
RIGHT RENAL ULTRASOUND KIDNEY SIZE MEASUREMENT 3: 9.9 CM
RIGHT RENAL ULTRASOUND KIDNEY SIZE MEASUREMENT AVERAGE: 10.1 CM
RIGHT RENAL ULTRASOUND RESISTIVE INDEX MEASUREMENT 1: 0.06
RIGHT RENAL ULTRASOUND RESISTIVE INDEX MEASUREMENT 2: 0.6
RIGHT RENAL ULTRASOUND RESISTIVE INDEX MEASUREMENT 3: 0.57
RIGHT RENAL ULTRASOUND RESISTIVE INDEX MEASUREMENT AVERAGE: 0.6
RIGHT VENTRICULAR END-DIASTOLIC DIMENSION: 3.91 CM
RV TISSUE DOPPLER FREE WALL SYSTOLIC VELOCITY 1 (APICAL 4 CHAMBER VIEW): 11.03 CM/S
SINUS: 2.71 CM
SODIUM SERPL-SCNC: 141 MMOL/L (ref 136–145)
STJ: 2.26 CM
TDI SEPTAL: 0.04 M/S
TR MAX PG: 25 MMHG
TRICUSPID ANNULAR PLANE SYSTOLIC EXCURSION: 3.05 CM
TRIGL SERPL-MCNC: 217 MG/DL (ref 30–150)
TSH SERPL DL<=0.005 MIU/L-ACNC: 3.6 UIU/ML (ref 0.4–4)
TV REST PULMONARY ARTERY PRESSURE: 28 MMHG
WBC # BLD AUTO: 5.47 K/UL (ref 3.9–12.7)

## 2019-08-23 PROCEDURE — 85025 COMPLETE CBC W/AUTO DIFF WBC: CPT

## 2019-08-23 PROCEDURE — 36415 COLL VENOUS BLD VENIPUNCTURE: CPT | Mod: PO

## 2019-08-23 PROCEDURE — 83880 ASSAY OF NATRIURETIC PEPTIDE: CPT

## 2019-08-23 PROCEDURE — 80061 LIPID PANEL: CPT

## 2019-08-23 PROCEDURE — 84443 ASSAY THYROID STIM HORMONE: CPT

## 2019-08-23 PROCEDURE — 93306 TTE W/DOPPLER COMPLETE: CPT | Mod: PBBFAC,PO | Performed by: INTERNAL MEDICINE

## 2019-08-23 PROCEDURE — 93975 VASCULAR STUDY: CPT | Mod: PBBFAC,PO | Performed by: INTERNAL MEDICINE

## 2019-08-23 PROCEDURE — 93306 TRANSTHORACIC ECHO (TTE) COMPLETE (CUPID ONLY): ICD-10-PCS | Mod: 26,S$PBB,, | Performed by: INTERNAL MEDICINE

## 2019-08-23 PROCEDURE — 93975 CV US RENAL ARTERY STENOSIS HYPERTENSION COMPLETE (CUPID ONLY): ICD-10-PCS | Mod: 26,S$PBB,, | Performed by: INTERNAL MEDICINE

## 2019-08-23 PROCEDURE — 80053 COMPREHEN METABOLIC PANEL: CPT

## 2019-09-16 ENCOUNTER — OFFICE VISIT (OUTPATIENT)
Dept: CARDIOLOGY | Facility: CLINIC | Age: 59
End: 2019-09-16
Payer: MEDICARE

## 2019-09-16 VITALS
DIASTOLIC BLOOD PRESSURE: 89 MMHG | BODY MASS INDEX: 47.12 KG/M2 | HEIGHT: 64 IN | WEIGHT: 276 LBS | HEART RATE: 68 BPM | SYSTOLIC BLOOD PRESSURE: 131 MMHG

## 2019-09-16 DIAGNOSIS — I70.1 ATHEROSCLEROTIC RAS (RENAL ARTERY STENOSIS), BILATERAL: Chronic | ICD-10-CM

## 2019-09-16 DIAGNOSIS — N18.30 STAGE 3 CHRONIC KIDNEY DISEASE: Chronic | ICD-10-CM

## 2019-09-16 DIAGNOSIS — E11.22 TYPE 2 DIABETES MELLITUS WITH STAGE 3 CHRONIC KIDNEY DISEASE, UNSPECIFIED WHETHER LONG TERM INSULIN USE: ICD-10-CM

## 2019-09-16 DIAGNOSIS — I25.10 CORONARY ARTERY DISEASE INVOLVING NATIVE CORONARY ARTERY OF NATIVE HEART WITHOUT ANGINA PECTORIS: Primary | Chronic | ICD-10-CM

## 2019-09-16 DIAGNOSIS — N18.3 TYPE 2 DIABETES MELLITUS WITH STAGE 3 CHRONIC KIDNEY DISEASE, UNSPECIFIED WHETHER LONG TERM INSULIN USE: ICD-10-CM

## 2019-09-16 DIAGNOSIS — E78.5 DYSLIPIDEMIA: Chronic | ICD-10-CM

## 2019-09-16 DIAGNOSIS — I71.40 ABDOMINAL AORTIC ANEURYSM (AAA) WITHOUT RUPTURE: Chronic | ICD-10-CM

## 2019-09-16 DIAGNOSIS — I10 HTN (HYPERTENSION), MALIGNANT: Chronic | ICD-10-CM

## 2019-09-16 PROCEDURE — 99999 PR PBB SHADOW E&M-EST. PATIENT-LVL III: CPT | Mod: PBBFAC,,, | Performed by: PHYSICIAN ASSISTANT

## 2019-09-16 PROCEDURE — 99999 PR PBB SHADOW E&M-EST. PATIENT-LVL III: ICD-10-PCS | Mod: PBBFAC,,, | Performed by: PHYSICIAN ASSISTANT

## 2019-09-16 PROCEDURE — 99214 OFFICE O/P EST MOD 30 MIN: CPT | Mod: S$PBB,,, | Performed by: PHYSICIAN ASSISTANT

## 2019-09-16 PROCEDURE — 99214 PR OFFICE/OUTPT VISIT, EST, LEVL IV, 30-39 MIN: ICD-10-PCS | Mod: S$PBB,,, | Performed by: PHYSICIAN ASSISTANT

## 2019-09-16 PROCEDURE — 99213 OFFICE O/P EST LOW 20 MIN: CPT | Mod: PBBFAC,PO | Performed by: PHYSICIAN ASSISTANT

## 2019-09-16 NOTE — PROGRESS NOTES
Subjective:    Patient ID:  Leena Gibbs is a 59 y.o. female who presents for follow-up of HTN.    HPI  Ms. Gibbs is a pleasant lady that follows with Dr. Doty. She presents for f/u of  here for follow up of resistant HTN. She did not start the hydralazine Dr. Doty prescribed her. She states she has been taking her other BP medications regularly and her BP has been in the 120s/70s at home. She denies CP, WHEELER, PND, or orthopnea. She has chronic LE edema that is stable. She has been trying to minimize her salt intake.     Review of Systems   Constitution: Negative for chills, diaphoresis, fever, malaise/fatigue, weight gain and weight loss.   HENT: Negative for sore throat.    Eyes: Negative for blurred vision, vision loss in left eye, vision loss in right eye and visual disturbance.   Cardiovascular: Negative for chest pain, claudication, cyanosis, dyspnea on exertion, irregular heartbeat, leg swelling, near-syncope, orthopnea, palpitations, paroxysmal nocturnal dyspnea and syncope.   Respiratory: Negative for cough, hemoptysis, shortness of breath, sputum production and wheezing.    Endocrine: Negative for cold intolerance and heat intolerance.   Hematologic/Lymphatic: Negative for adenopathy. Does not bruise/bleed easily.   Skin: Negative for rash.   Musculoskeletal: Negative for back pain, falls, joint pain, muscle cramps, muscle weakness and myalgias.   Gastrointestinal: Negative for abdominal pain, change in bowel habit, constipation, diarrhea, melena, nausea and vomiting.   Genitourinary: Negative for bladder incontinence and urgency.   Neurological: Negative for dizziness, focal weakness, headaches, light-headedness, numbness and weakness.   Psychiatric/Behavioral: Negative for altered mental status.       Past Medical History:   Diagnosis Date    AAA (abdominal aortic aneurysm)     Anticoagulant long-term use     plavix    Arthritis     Atherosclerotic DEJA (renal artery stenosis), bilateral  3/27/2015    2/2015 Rt renal- 40%, Lt renal - 30%     Blindness of left eye     CAD (coronary artery disease) 3/27/2015    2/2015 LAD- 50% mid and distal, Cx- 20%, OM2 80%, smll branch 60%, RCA- non-dom 30%     CKD (chronic kidney disease) 1/17/2012    Dr Beatty     Colon polyp     Coronary artery disease     CVA (cerebral infarction)     x2; 12/27/2006 & 1/18/2010    Diverticulosis     DM (diabetes mellitus)     Dyslipidemia 1/17/2012    Dyspnea on exertion     Gout     HTN (hypertension), malignant 1/17/2012    11/15/2010 bilateral Renal, 10% stenosis     Hyperlipidemia     Hypertension     Kidney disease     stage 3, sees nephrologist Dr Beatty    Mitral regurgitation 1/17/2012 17/13 mod/sev     Mitral valve regurgitation     PONV (postoperative nausea and vomiting)     Renal failure     stage 3, per patient.  Sees Dr Dorothy Beatty    Sleep apnea with use of continuous positive airway pressure (CPAP)     Stented coronary artery 3/27/2015    2/2015 OM2- promus 2.75x30     Stroke 2006 / 2010    does not use any assistive devices          Objective:     Vitals:    09/16/19 1256   BP: 131/89   Pulse: 68        Physical Exam   Constitutional: She is oriented to person, place, and time. She appears well-developed and well-nourished. No distress.   HENT:   Head: Normocephalic and atraumatic.   Mouth/Throat: Oropharynx is clear and moist.   Eyes: Pupils are equal, round, and reactive to light. Conjunctivae and EOM are normal. No scleral icterus.   Neck: Normal range of motion. Neck supple. No JVD present. No tracheal deviation present.   Cardiovascular: Normal rate, regular rhythm, normal heart sounds and intact distal pulses. Exam reveals no gallop and no friction rub.   Pulmonary/Chest: Effort normal and breath sounds normal. No respiratory distress. She has no wheezes. She has no rales. She exhibits no tenderness.   Abdominal: Soft. Bowel sounds are normal. She exhibits no distension.  There is no hepatosplenomegaly. There is no tenderness.   Musculoskeletal: She exhibits no edema or tenderness.   Neurological: She is alert and oriented to person, place, and time.   Skin: Skin is warm and dry. No rash noted. No erythema.   Psychiatric: She has a normal mood and affect. Her behavior is normal.             ..    Chemistry        Component Value Date/Time     08/23/2019 0848    K 4.5 08/23/2019 0848     08/23/2019 0848    CO2 26 08/23/2019 0848    BUN 39 (H) 08/23/2019 0848    CREATININE 2.6 (H) 08/23/2019 0848    GLU 92 08/23/2019 0848        Component Value Date/Time    CALCIUM 10.2 08/23/2019 0848    ALKPHOS 62 08/23/2019 0848    AST 14 08/23/2019 0848    AST 18 11/28/2015 1922    ALT 10 08/23/2019 0848    BILITOT 0.3 08/23/2019 0848    ESTGFRAFRICA 22.4 (A) 08/23/2019 0848    EGFRNONAA 19.5 (A) 08/23/2019 0848            ..  Lab Results   Component Value Date    CHOL 213 (H) 08/23/2019    CHOL 288 (H) 11/30/2018    CHOL 191 03/12/2018     Lab Results   Component Value Date    HDL 34 (L) 08/23/2019    HDL 38 (L) 11/30/2018    HDL 44 03/12/2018     Lab Results   Component Value Date    LDLCALC 135.6 08/23/2019    LDLCALC 208.6 (H) 11/30/2018    LDLCALC 128.8 03/12/2018     Lab Results   Component Value Date    TRIG 217 (H) 08/23/2019    TRIG 207 (H) 11/30/2018    TRIG 91 03/12/2018     Lab Results   Component Value Date    CHOLHDL 16.0 (L) 08/23/2019    CHOLHDL 13.2 (L) 11/30/2018    CHOLHDL 23.0 03/12/2018     ..  Lab Results   Component Value Date    WBC 5.47 08/23/2019    HGB 13.5 08/23/2019    HCT 44.1 08/23/2019    MCV 89 08/23/2019     08/23/2019       Test(s) Reviewed  I have reviewed the following in detail:  [] Stress test   [] Angiography   [x] Echocardiogram   [x] Labs   [x] Other:  Renal artery US     Assessment:       No diagnosis found.  Problem List Items Addressed This Visit        Cardiology Problems    AAA (abdominal aortic aneurysm) (Chronic)    Overview      1/2014  Findings:  Real-time, color flow and Doppler imaging of the aorta was performed.  Atherosclerotic plaque identified within the distal aorta and proximal common iliac arteries. There is mild saccular aneurysmal dilatation/ focal ectasia of the distal abdominal aorta.  Proximal aorta: 3.1 cm AP x 2.9 cm TRV.   Mid aorta: 2.0 cm x 2.3 cm.  Distal aorta: 3.4 cm x 2.6 cm. (3.0 cm in length)  Proximal common iliac arteries measured 1.3 cm and 1.1 cm maximum diameter on the right and left respectively.      Result Impression    1. Atherosclerotic plaque and mild ectasia/saccular aneurysmal dilatation of the distal abd aorta.  2. No other cystic findings.  3. See discussion above. Further evaluation and/or follow-up imaging as warranted.   Electronically signed by: MARRY MARCUS III, MD       1/12 3.3 cm         Atherosclerotic DEJA (renal artery stenosis), bilateral (Chronic)    Overview     2/2015 Rt renal- 40%, Lt renal - 30%  8/2019  Renal US 0-59% bilat         CAD (coronary artery disease) - Primary (Chronic)    Overview     2/2015 LAD- 50% mid and distal, Cx- 20%, OM2 80%, smll branch 60%, RCA- non-dom 30%         HTN (hypertension), malignant (Chronic)    Overview     11/15/2010 bilateral Renal, 10% stenosis            Other    CKD (chronic kidney disease) (Chronic)    Overview     Dr Beatty         DM (diabetes mellitus)    Dyslipidemia (Chronic)    Overview     Uncontrolled  Goal LDL < 100                Plan:           Continue current medications.   F/U with Dr. Doty as scheduled.   Low level/low impact aerobic exercise 5x's/wk. Heart healthy diet and risk factor modification.

## 2019-10-03 ENCOUNTER — TELEPHONE (OUTPATIENT)
Dept: CARDIOLOGY | Facility: CLINIC | Age: 59
End: 2019-10-03

## 2019-10-03 NOTE — TELEPHONE ENCOUNTER
----- Message from Joshua Garza sent at 10/3/2019 10:05 AM CDT -----  Contact: pt  Type: Needs Medical Advice    Who Called:  pt    Best Call Back Number: [phone]  Additional Information: pt states she just had labs done on 8.23.19. Pt has questions and is requesting a call back to discuss.

## 2019-12-04 ENCOUNTER — TELEPHONE (OUTPATIENT)
Dept: GASTROENTEROLOGY | Facility: CLINIC | Age: 59
End: 2019-12-04

## 2019-12-04 NOTE — TELEPHONE ENCOUNTER
----- Message from Khadra Armando sent at 12/4/2019  8:16 AM CST -----    Pt  Is calling to  See  When  Her  Last   Colonoscopy  Was // please call  [phone]  Pt is  Having  Some   Issues

## 2020-02-02 PROBLEM — R42 DIZZINESS: Status: ACTIVE | Noted: 2020-02-02

## 2020-02-04 PROBLEM — I63.9 ACUTE CVA (CEREBROVASCULAR ACCIDENT): Status: ACTIVE | Noted: 2020-02-02

## 2020-02-12 ENCOUNTER — TELEPHONE (OUTPATIENT)
Dept: CARDIOLOGY | Facility: CLINIC | Age: 60
End: 2020-02-12

## 2020-02-12 NOTE — TELEPHONE ENCOUNTER
----- Message from Dionte Bacon sent at 2/12/2020 10:07 AM CST -----  Contact: pt  Type:  Patient Returning Call    Who Called:  pt  Who Left Message for Patient:  unk  Does the patient know what this is regarding?:  Rescheduling 6mo followup appt from 2/26/20  Best Call Back Number:  [phone]  Additional Information:  Pt looking for sometime in march

## 2020-02-28 ENCOUNTER — TELEPHONE (OUTPATIENT)
Dept: CARDIOLOGY | Facility: CLINIC | Age: 60
End: 2020-02-28

## 2020-02-28 NOTE — TELEPHONE ENCOUNTER
Please advise: pt has been taking Plavix daily since her stoke and wants to go back to Monday Wednesday Friday due to constipation

## 2020-02-28 NOTE — TELEPHONE ENCOUNTER
----- Message from Radha Rivers sent at 2/28/2020  4:04 PM CST -----  Contact: Patient  Type: Needs Medical Advice  Who Called:  patient  Best Call Back Number: [phone]  Additional Information: patient states that she would like to speak with nurse about her medication clopidogreL (PLAVIX) 75 mg tablet.  Please call to advise.  Thanks!

## 2020-03-03 ENCOUNTER — TELEPHONE (OUTPATIENT)
Dept: CARDIOLOGY | Facility: CLINIC | Age: 60
End: 2020-03-03

## 2020-03-03 NOTE — TELEPHONE ENCOUNTER
----- Message from Izabel Alcazar sent at 3/3/2020  2:00 PM CST -----  Contact: pt [phone]  Patient called she states she spoke to the nurse lat Friday and she has not heard back from the office at this time.

## 2020-03-11 DIAGNOSIS — Z95.5 STENTED CORONARY ARTERY: Chronic | ICD-10-CM

## 2020-03-11 DIAGNOSIS — I71.30 RUPTURED ABDOMINAL AORTIC ANEURYSM (AAA): Chronic | ICD-10-CM

## 2020-03-11 DIAGNOSIS — I25.83 CORONARY ARTERY DISEASE DUE TO LIPID RICH PLAQUE: Chronic | ICD-10-CM

## 2020-03-11 DIAGNOSIS — I70.1 ATHEROSCLEROTIC RAS (RENAL ARTERY STENOSIS), BILATERAL: Chronic | ICD-10-CM

## 2020-03-11 DIAGNOSIS — I25.10 CORONARY ARTERY DISEASE DUE TO LIPID RICH PLAQUE: Chronic | ICD-10-CM

## 2020-03-11 DIAGNOSIS — E78.5 DYSLIPIDEMIA: Chronic | ICD-10-CM

## 2020-03-11 DIAGNOSIS — I10 HTN (HYPERTENSION), MALIGNANT: Chronic | ICD-10-CM

## 2020-03-11 DIAGNOSIS — I34.0 NON-RHEUMATIC MITRAL REGURGITATION: Chronic | ICD-10-CM

## 2020-03-13 RX ORDER — ROSUVASTATIN CALCIUM 20 MG/1
20 TABLET, COATED ORAL NIGHTLY
Qty: 90 TABLET | Refills: 3 | Status: SHIPPED | OUTPATIENT
Start: 2020-03-13 | End: 2021-02-06

## 2020-03-16 NOTE — PROGRESS NOTES
Subjective:       Patient ID: Leena Gibbs is a 57 y.o. female Body mass index is 49.78 kg/m².    Chief Complaint: Advice Only ( diverticulosis)    This patient is new to me.  Established patient of Dr. Post (last in 2011).    Patient seen for colorectal cancer screening, high risk due to personal history of colon polyp, age appropriate, 2nd occurrence, last colonoscopy was in 11/2011: see surgical history, with no associated signs/symptoms (see ROS), and no alleviating/exacerbating factors. Family history of colon cancer in father, diagnosed in his 60's.      GI Problem   Primary symptoms do not include fever, weight loss, abdominal pain, nausea, vomiting, diarrhea, melena, hematemesis, jaundice or hematochezia. Primary symptoms comment: CHIEF COMPLAINT: thought she had a kidney stone and was being worked up for it; had ct scan which showed diverticulosis.   The illness is also significant for back pain (occasional spasms). The illness does not include dysphagia, odynophagia or constipation. Significant associated medical issues include gallstones (s/p cholecystectomy in 1986). Associated medical issues do not include inflammatory bowel disease or diverticulitis (positive history of diverticulosis).     Review of Systems   Constitutional: Negative for appetite change, fever, unexpected weight change and weight loss.   HENT: Negative for trouble swallowing.    Respiratory: Negative for shortness of breath.    Cardiovascular: Negative for chest pain.   Gastrointestinal: Negative for abdominal pain, anal bleeding, blood in stool, constipation, diarrhea, dysphagia, hematemesis, hematochezia, jaundice, melena, nausea, rectal pain and vomiting.   Musculoskeletal: Positive for back pain (occasional spasms).       Past Medical History:   Diagnosis Date    AAA (abdominal aortic aneurysm)     Anticoagulant long-term use     Blindness of left eye     Colon polyp     Coronary artery disease     CVA (cerebral  infarction)     x2; 12/27/2006 & 1/18/2010    Diverticulosis     DM (diabetes mellitus)     Dyspnea on exertion     Gout     Hyperlipidemia     Hypertension     Kidney disease     stage 3, sees nephrologist Dr Beatty    Mitral valve regurgitation     PONV (postoperative nausea and vomiting)     Renal failure     stage 3, per patient.  Sees Dr Dorothy Beatty    Sleep apnea with use of continuous positive airway pressure (CPAP)     Stroke      Past Surgical History:   Procedure Laterality Date    BREAST BIOPSY      benign    CARDIAC CATHETERIZATION  02/2015    with 1 stent    CARDIAC CATHETERIZATION  05/2016    CHOLECYSTECTOMY      COLONOSCOPY  11/04/2011    Dr. Post, in [place]: colon polyps removed, reduntant colon; repeat in 3 years; biopsy: 1. TUBULAR ADENOMA. & 2. tubulovillous adenoma    CORNEAL TRANSPLANT Left     twice    EYE SURGERY Left     cornea transplant twice    KNEE SURGERY      Right    TOTAL ABDOMINAL HYSTERECTOMY       Family History   Problem Relation Age of Onset    Stroke Father     Cancer Father     Hypertension Father     Diabetes Father     Colon cancer Father      diagnosed in his 60's    Hypertension Mother     Heart disease Mother     Diabetes Sister     Diabetes Brother     Crohn's disease Neg Hx     Ulcerative colitis Neg Hx      Wt Readings from Last 10 Encounters:   10/23/17 127.5 kg (281 lb)   10/23/17 127.8 kg (281 lb 12 oz)   03/21/17 131 kg (288 lb 12.8 oz)   01/04/17 133.8 kg (294 lb 15.6 oz)   12/29/16 133.4 kg (294 lb 1.5 oz)   05/17/16 131.5 kg (290 lb)   05/11/16 133.1 kg (293 lb 6.9 oz)   04/18/16 133.7 kg (294 lb 12.1 oz)   11/28/15 127 kg (279 lb 15.8 oz)   09/24/15 130.9 kg (288 lb 9.3 oz)     Lab Results   Component Value Date    WBC 5.35 02/24/2017    HGB 13.7 02/24/2017    HCT 43.4 02/24/2017    MCV 89 02/24/2017     02/24/2017     CMP  Sodium   Date Value Ref Range Status   02/24/2017 142 136 - 145 mmol/L Final     Potassium    Date Value Ref Range Status   02/24/2017 4.3 3.5 - 5.1 mmol/L Final     Chloride   Date Value Ref Range Status   02/24/2017 112 (H) 95 - 110 mmol/L Final     CO2   Date Value Ref Range Status   02/24/2017 22 (L) 23 - 29 mmol/L Final     Glucose   Date Value Ref Range Status   02/24/2017 100 70 - 110 mg/dL Final     BUN, Bld   Date Value Ref Range Status   02/24/2017 25 (H) 6 - 20 mg/dL Final     Creatinine   Date Value Ref Range Status   02/24/2017 2.0 (H) 0.5 - 1.4 mg/dL Final     Calcium   Date Value Ref Range Status   02/24/2017 9.6 8.7 - 10.5 mg/dL Final     Total Protein   Date Value Ref Range Status   02/24/2017 7.7 6.0 - 8.4 g/dL Final     Albumin   Date Value Ref Range Status   02/24/2017 3.5 3.5 - 5.2 g/dL Final     Total Bilirubin   Date Value Ref Range Status   02/24/2017 0.4 0.1 - 1.0 mg/dL Final     Comment:     For infants and newborns, interpretation of results should be based  on gestational age, weight and in agreement with clinical  observations.  Premature Infant recommended reference ranges:  Up to 24 hours.............<8.0 mg/dL  Up to 48 hours............<12.0 mg/dL  3-5 days..................<15.0 mg/dL  6-29 days.................<15.0 mg/dL       Alkaline Phosphatase   Date Value Ref Range Status   02/24/2017 80 55 - 135 U/L Final     AST ([place])   Date Value Ref Range Status   11/28/2015 18 14 - 36 U/L Final     AST   Date Value Ref Range Status   02/24/2017 14 10 - 40 U/L Final     ALT   Date Value Ref Range Status   02/24/2017 11 10 - 44 U/L Final     Anion Gap   Date Value Ref Range Status   02/24/2017 8 8 - 16 mmol/L Final     eGFR if    Date Value Ref Range Status   02/24/2017 31.5 (A) >60 mL/min/1.73 m^2 Final     eGFR if non    Date Value Ref Range Status   02/24/2017 27.3 (A) >60 mL/min/1.73 m^2 Final     Comment:     Calculation used to obtain the estimated glomerular filtration  rate (eGFR) is the CKD-EPI equation. Since race is unknown  "  in our information system, the eGFR values for   -American and Non--American patients are given   for each creatinine result.       Lab Results   Component Value Date    AMYLASE 109 11/28/2015     Lab Results   Component Value Date    LIPASE 105 11/28/2015     Lab Results   Component Value Date    TSH 2.53 09/29/2011     Reviewed prior medical records including radiology report of 11/28/15 ct abdomen pelvis, 10/6/17 renal ultrasound (in care everywhere), & endoscopy history (see surgical history).  10/16/17 ct abdomen pelvis (found in care everywhere) was reviewed and radiology report states:  " FINDINGS: The lung bases are clear. The base of the heart appears mildly enlarged. Coronary vessel endovascular stents are noted and there is coronary vessel atherosclerosis.  The patient is status post cholecystectomy. No biliary dilatation. No abnormality of the unenhanced liver, spleen, or pancreas. Adrenal glands appear unremarkable. Symmetrical renal size without hydronephrosis. 2 cm parapelvic cyst in association with   the left kidney. 1.7 cm cyst in the upper pole of the right kidney.  There is no bowel obstruction or inflammation. Scattered colonic diverticuli without CT evidence of acute diverticulitis. No intraperitoneal free air or free fluid. Incidental note of an omental fat-containing umbilical hernia.  Patchy calcified plaques of the abdominal aorta with focal ectasia of the infrarenal abdominal aorta measuring 2.1 x 2.0 cm in greatest transverse dimension. Ectasia is best visualized on coronal sequences as depicted on series 300 B image 50). No   appreciable lymphadenopathy. Hysterectomy. Urinary bladder is unremarkable. There are degenerative changes of the hips, sacroiliac joints, and lumbar spine.  IMPRESSION:   1.  No acute findings or abnormalities.  2. Bilateral renal cysts.  3. Colonic diverticulosis.  4. Abdominal aortic atherosclerosis with infrarenal abdominal aortic ectasia.  " ""    Objective:      Physical Exam   Constitutional: She is oriented to person, place, and time. She appears well-developed and well-nourished. No distress.   HENT:   Mouth/Throat: Oropharynx is clear and moist and mucous membranes are normal. No oral lesions. No oropharyngeal exudate.   Eyes: Conjunctivae are normal. Pupils are equal, round, and reactive to light. No scleral icterus.   Cardiovascular: Normal rate.    Pulmonary/Chest: Effort normal and breath sounds normal. No respiratory distress. She has no wheezes.   Abdominal: Soft. Normal appearance and bowel sounds are normal. She exhibits no distension, no abdominal bruit and no mass. There is no hepatosplenomegaly. There is no tenderness. There is no rigidity, no rebound, no guarding, no tenderness at McBurney's point and negative Aguilar's sign. No hernia.   Neurological: She is alert and oriented to person, place, and time.   Skin: Skin is warm and dry. No rash noted. She is not diaphoretic. No erythema. No pallor.   Non-jaundiced   Psychiatric: She has a normal mood and affect. Her behavior is normal. Judgment and thought content normal.   Nursing note and vitals reviewed.      Assessment:       1. History of diverticulosis    2. History of colon polyps    3. Family history of colon cancer        Plan:       History of diverticulosis  - schedule Colonoscopy, discussed procedure with the patient, verbalized understanding  - discussed the diagnosis of diverticulosis and diverticulitis, & to prevent diverticulitis, high fiber diet is recommended.  - Recommended daily exercise, adequate water intake (six 8-oz glasses of water daily), and high fiber diet. OTC fiber supplements are recommended if diet does not reach daily fiber goal (25 grams daily), such as Metamucil, Citrucel, or FiberCon (take as directed, separate from other oral medications by >2 hours).  - Advised to avoid/minimize popcorn, corn, seeds, and nuts.    History of colon polyps & Family history " of colon cancer  - schedule Colonoscopy, discussed procedure with the patient, patient verbalized understanding    Return in about 1 month (around 11/23/2017), or if symptoms worsen or fail to improve.      If no improvement in symptoms or symptoms worsen, call/follow-up at clinic or go to ER.       Statement Selected

## 2020-04-06 DIAGNOSIS — E78.5 DYSLIPIDEMIA: Chronic | ICD-10-CM

## 2020-04-06 DIAGNOSIS — I71.40 ABDOMINAL AORTIC ANEURYSM WITHOUT RUPTURE: Chronic | ICD-10-CM

## 2020-04-06 DIAGNOSIS — I10 HTN (HYPERTENSION), MALIGNANT: Chronic | ICD-10-CM

## 2020-04-06 DIAGNOSIS — Z95.5 STENTED CORONARY ARTERY: Chronic | ICD-10-CM

## 2020-04-06 DIAGNOSIS — I25.10 CORONARY ARTERY DISEASE INVOLVING NATIVE CORONARY ARTERY OF NATIVE HEART WITHOUT ANGINA PECTORIS: Chronic | ICD-10-CM

## 2020-04-06 DIAGNOSIS — I70.1 ATHEROSCLEROTIC RAS (RENAL ARTERY STENOSIS), BILATERAL: Chronic | ICD-10-CM

## 2020-04-06 DIAGNOSIS — I34.0 MITRAL VALVE INSUFFICIENCY, UNSPECIFIED ETIOLOGY: ICD-10-CM

## 2020-04-06 RX ORDER — CARVEDILOL 25 MG/1
25 TABLET ORAL 2 TIMES DAILY WITH MEALS
Qty: 180 TABLET | Refills: 4 | Status: SHIPPED | OUTPATIENT
Start: 2020-04-06 | End: 2021-05-05

## 2020-04-06 RX ORDER — CLOPIDOGREL BISULFATE 75 MG/1
TABLET ORAL
Qty: 90 TABLET | Refills: 4 | Status: SHIPPED | OUTPATIENT
Start: 2020-04-06 | End: 2020-04-06 | Stop reason: SDUPTHER

## 2020-04-06 NOTE — TELEPHONE ENCOUNTER
----- Message from Allyson Fay sent at 4/6/2020  9:18 AM CDT -----  Type:  RX Refill Request    Who Called: Patient  RX Name and Strength:  carvedilol (COREG) 25 MG tablet and clopidogreL (PLAVIX) 75 mg tablet  Preferred Pharmacy with phone number:  [facility] Call Back Number:  [phone]  Additional Information:

## 2020-04-07 RX ORDER — CLOPIDOGREL BISULFATE 75 MG/1
75 TABLET ORAL DAILY
Qty: 90 TABLET | Refills: 4 | Status: SHIPPED | OUTPATIENT
Start: 2020-04-07 | End: 2020-06-10

## 2020-04-09 ENCOUNTER — OFFICE VISIT (OUTPATIENT)
Dept: CARDIOLOGY | Facility: CLINIC | Age: 60
End: 2020-04-09
Payer: MEDICARE

## 2020-04-09 ENCOUNTER — TELEPHONE (OUTPATIENT)
Dept: CARDIOLOGY | Facility: CLINIC | Age: 60
End: 2020-04-09

## 2020-04-09 DIAGNOSIS — I63.9 ACUTE CVA (CEREBROVASCULAR ACCIDENT): ICD-10-CM

## 2020-04-09 DIAGNOSIS — I34.0 MITRAL VALVE INSUFFICIENCY, UNSPECIFIED ETIOLOGY: Chronic | ICD-10-CM

## 2020-04-09 DIAGNOSIS — I71.40 ABDOMINAL AORTIC ANEURYSM (AAA) WITHOUT RUPTURE: Chronic | ICD-10-CM

## 2020-04-09 DIAGNOSIS — E78.5 DYSLIPIDEMIA: Chronic | ICD-10-CM

## 2020-04-09 DIAGNOSIS — I65.1 BASILAR ARTERY STENOSIS: ICD-10-CM

## 2020-04-09 DIAGNOSIS — I25.10 CORONARY ARTERY DISEASE INVOLVING NATIVE CORONARY ARTERY OF NATIVE HEART WITHOUT ANGINA PECTORIS: Chronic | ICD-10-CM

## 2020-04-09 DIAGNOSIS — I10 HTN (HYPERTENSION), MALIGNANT: Chronic | ICD-10-CM

## 2020-04-09 DIAGNOSIS — Z95.5 STENTED CORONARY ARTERY: Primary | Chronic | ICD-10-CM

## 2020-04-09 DIAGNOSIS — I70.1 ATHEROSCLEROTIC RAS (RENAL ARTERY STENOSIS), BILATERAL: Chronic | ICD-10-CM

## 2020-04-09 DIAGNOSIS — N18.30 CHRONIC KIDNEY DISEASE, STAGE 3: Chronic | ICD-10-CM

## 2020-04-09 PROCEDURE — 99999 PR PBB SHADOW E&M-EST. PATIENT-LVL I: CPT | Mod: PBBFAC,,, | Performed by: INTERNAL MEDICINE

## 2020-04-09 PROCEDURE — 99999 PR PBB SHADOW E&M-EST. PATIENT-LVL I: ICD-10-PCS | Mod: PBBFAC,,, | Performed by: INTERNAL MEDICINE

## 2020-04-09 PROCEDURE — 99212 OFFICE O/P EST SF 10 MIN: CPT | Mod: S$PBB,,, | Performed by: INTERNAL MEDICINE

## 2020-04-09 PROCEDURE — 99212 PR OFFICE/OUTPT VISIT, EST, LEVL II, 10-19 MIN: ICD-10-PCS | Mod: S$PBB,,, | Performed by: INTERNAL MEDICINE

## 2020-04-09 PROCEDURE — 99211 OFF/OP EST MAY X REQ PHY/QHP: CPT | Mod: PBBFAC,PO | Performed by: INTERNAL MEDICINE

## 2020-04-09 NOTE — TELEPHONE ENCOUNTER
----- Message from David Guzman sent at 4/9/2020 11:35 AM CDT -----  Contact: Ptnt [phone]  Type: Needs Medical Advice    Who Called:  Ptnt [phone]    Additional Information: Advised unable to accomplish VV appmnt would like a VT phone call instead. Please call.

## 2020-04-09 NOTE — PROGRESS NOTES
Subjective:    Patient ID:  Leena Gibbs is a 59 y.o. female who presents for follow-up of No chief complaint on file.      HPI  Ms. Gibbs here for follow up of resistant HTN/small AAA/MR. Recent small CVA (2/2020). No angina. Patient denies palpitations, syncope, presyncope, lightheadedness or dizziness.  No further CVA sx's. Remains active.     The patient location is: Home  The chief complaint leading to consultation is: MR  Visit type: Virtual visit with synchronous audio and video  Total time spent with patient: 10  Each patient to whom he or she provides medical services by telemedicine is:  (1) informed of the relationship between the physician and patient and the respective role of any other health care provider with respect to management of the patient; and (2) notified that he or she may decline to receive medical services by telemedicine and may withdraw from such care at any time.          Review of Systems   Constitution: Negative for malaise/fatigue.   Eyes: Negative for blurred vision.   Cardiovascular: Negative for chest pain, claudication, cyanosis, dyspnea on exertion, irregular heartbeat, leg swelling, near-syncope, orthopnea, palpitations, paroxysmal nocturnal dyspnea and syncope.   Respiratory: Negative for cough and shortness of breath.    Hematologic/Lymphatic: Does not bruise/bleed easily.   Musculoskeletal: Negative for back pain, falls, joint pain, muscle cramps, muscle weakness and myalgias.   Gastrointestinal: Negative for abdominal pain, change in bowel habit, nausea and vomiting.   Genitourinary: Negative for urgency.   Neurological: Negative for dizziness, focal weakness and light-headedness.       Past Medical History:   Diagnosis Date    AAA (abdominal aortic aneurysm)     Anticoagulant long-term use     plavix    Arthritis     Atherosclerotic DEJA (renal artery stenosis), bilateral 3/27/2015    2/2015 Rt renal- 40%, Lt renal - 30%     Blindness of left eye     CAD  (coronary artery disease) 3/27/2015    2/2015 LAD- 50% mid and distal, Cx- 20%, OM2 80%, smll branch 60%, RCA- non-dom 30%     CKD (chronic kidney disease) 01/17/2012    stage 3, seen by Dr Dorothy Beatty     Claustrophobia     per pt's chart, anesthesia needed for MRI    Colon polyp     Coronary artery disease     CVA (cerebral infarction)     x2; 12/27/2006 & 1/18/2010    Diverticulosis     DM (diabetes mellitus)     Dyslipidemia 1/17/2012    Dyspnea on exertion     Gout     HTN (hypertension), malignant 1/17/2012    11/15/2010 bilateral Renal, 10% stenosis     Hyperlipidemia     Hypertension     Kidney disease     stage 3, sees nephrologist Dr Beatty    Mitral regurgitation 1/17/2012 17/13 mod/sev     Mitral valve regurgitation     PONV (postoperative nausea and vomiting)     Sleep apnea with use of continuous positive airway pressure (CPAP)     Stented coronary artery 3/27/2015    2/2015 OM2- promus 2.75x30     Stroke 2006 / 2010    does not use any assistive devices     Patient Active Problem List   Diagnosis    HTN (hypertension), malignant    Mitral regurgitation    AAA (abdominal aortic aneurysm)    Dyslipidemia    Diabetes mellitus, type 2    Chronic kidney disease, stage 3    Post corneal transplant    Stented coronary artery    CAD (coronary artery disease)    Atherosclerotic DEJA (renal artery stenosis), bilateral    Breast lump    Hx of colonic polyps    Unspecified lump in left breast, subareolar    Acute CVA (cerebrovascular accident)    Dizziness        Objective:     There were no vitals filed for this visit.     Physical Exam    bp 136/88 hr 75      ..    Chemistry        Component Value Date/Time     02/04/2020 0633    K 4.4 02/04/2020 0633     02/04/2020 0633    CO2 24 02/04/2020 0633    BUN 31 (H) 02/04/2020 0633    CREATININE 1.88 (H) 02/04/2020 0633    GLU 89 02/04/2020 0633        Component Value Date/Time    CALCIUM 9.4 02/04/2020 0633     ALKPHOS 53 02/04/2020 0633    AST 26 02/04/2020 0633    AST 18 11/28/2015 1922    ALT 12 02/04/2020 0633    BILITOT 0.4 02/04/2020 0633    ESTGFRAFRICA 33 (A) 02/04/2020 0633    EGFRNONAA 29 (A) 02/04/2020 0633            ..  Lab Results   Component Value Date    CHOL 174 02/02/2020    CHOL 213 (H) 08/23/2019    CHOL 288 (H) 11/30/2018     Lab Results   Component Value Date    HDL 31 (L) 02/02/2020    HDL 34 (L) 08/23/2019    HDL 38 (L) 11/30/2018     Lab Results   Component Value Date    LDLCALC 113.8 02/02/2020    LDLCALC 135.6 08/23/2019    LDLCALC 208.6 (H) 11/30/2018     Lab Results   Component Value Date    TRIG 146 02/02/2020    TRIG 217 (H) 08/23/2019    TRIG 207 (H) 11/30/2018     Lab Results   Component Value Date    CHOLHDL 17.8 (L) 02/02/2020    CHOLHDL 16.0 (L) 08/23/2019    CHOLHDL 13.2 (L) 11/30/2018     ..  Lab Results   Component Value Date    WBC 5.89 02/04/2020    HGB 11.8 (L) 02/04/2020    HCT 38.1 02/04/2020    MCV 88 02/04/2020     02/04/2020       Test(s) Reviewed  I have reviewed the following in detail:  [] Stress test   [] Angiography   [x] Echocardiogram   [x] Labs   [x] Other:       Assessment:         ICD-10-CM ICD-9-CM   1. Stented coronary artery Z95.5 V45.82   2. Dyslipidemia E78.5 272.4   3. Abdominal aortic aneurysm (AAA) without rupture I71.4 441.4   4. Acute CVA (cerebrovascular accident) I63.9 434.91   5. Atherosclerotic DEJA (renal artery stenosis), bilateral I70.1 440.1   6. Coronary artery disease involving native coronary artery of native heart without angina pectoris I25.10 414.01   7. HTN (hypertension), malignant I10 401.0   8. Mitral valve insufficiency, unspecified etiology I34.0 424.0   9. Chronic kidney disease, stage 3 N18.3 585.3     Problem List Items Addressed This Visit     Stented coronary artery - Primary (Chronic)    Overview     2/2015 OM2- promus 2.75x30         Mitral regurgitation (Chronic)    Overview     17/13 mod/sev         HTN (hypertension),  malignant (Chronic)    Overview     11/15/2010 bilateral Renal, 10% stenosis         Dyslipidemia (Chronic)    Overview     Uncontrolled  Goal LDL < 100         Chronic kidney disease, stage 3 (Chronic)    Overview     Dr Beatty         CAD (coronary artery disease) (Chronic)    Overview     2/2015 LAD- 50% mid and distal, Cx- 20%, OM2 80%, smll branch 60%, RCA- non-dom 30%         Atherosclerotic DEJA (renal artery stenosis), bilateral (Chronic)    Overview     2/2015 Rt renal- 40%, Lt renal - 30%  8/2019  Renal US 0-59% bilat         Acute CVA (cerebrovascular accident)    Overview     2/20  left lateral aspect of the renan which is concerning for acute or subacute ischemia/infarction         AAA (abdominal aortic aneurysm) (Chronic)    Overview     1/2014  Findings:  Real-time, color flow and Doppler imaging of the aorta was performed.  Atherosclerotic plaque identified within the distal aorta and proximal common iliac arteries. There is mild saccular aneurysmal dilatation/ focal ectasia of the distal abdominal aorta.  Proximal aorta: 3.1 cm AP x 2.9 cm TRV.   Mid aorta: 2.0 cm x 2.3 cm.  Distal aorta: 3.4 cm x 2.6 cm. (3.0 cm in length)  Proximal common iliac arteries measured 1.3 cm and 1.1 cm maximum diameter on the right and left respectively.      Result Impression    1. Atherosclerotic plaque and mild ectasia/saccular aneurysmal dilatation of the distal abd aorta.  2. No other cystic findings.  3. See discussion above. Further evaluation and/or follow-up imaging as warranted.   Electronically signed by: MARRY MARCUS III, MD       1/12 3.3 cm                Plan:           Return to clinic 9 months   Low level/low impact aerobic exercise 5x's/wk. Heart healthy diet and risk factor modification.    See labs and med orders.  Avoid salt   F/u PCP, renal  Orders Placed This Encounter   Procedures    Lipid panel    Comprehensive metabolic panel    CV Abdominal Aorta       Portions of this note may have been  created with voice recognition software.  Grammatical, syntax and spelling errors may be inevitable.

## 2020-05-06 DIAGNOSIS — N32.81 OAB (OVERACTIVE BLADDER): ICD-10-CM

## 2020-05-06 DIAGNOSIS — R39.15 URINARY URGENCY: ICD-10-CM

## 2020-05-06 RX ORDER — FESOTERODINE FUMARATE 8 MG/1
8 TABLET, EXTENDED RELEASE ORAL DAILY
Qty: 30 TABLET | Refills: 11 | Status: SHIPPED | OUTPATIENT
Start: 2020-05-06 | End: 2021-05-05

## 2020-07-17 DIAGNOSIS — I10 HTN (HYPERTENSION), MALIGNANT: Chronic | ICD-10-CM

## 2020-07-17 RX ORDER — CLONIDINE HYDROCHLORIDE 0.1 MG/1
0.1 TABLET ORAL
Qty: 90 TABLET | Refills: 4 | Status: SHIPPED | OUTPATIENT
Start: 2020-07-17 | End: 2021-03-01

## 2020-07-24 ENCOUNTER — TELEPHONE (OUTPATIENT)
Dept: ENDOCRINOLOGY | Facility: CLINIC | Age: 60
End: 2020-07-24

## 2020-09-02 RX ORDER — AMLODIPINE BESYLATE 10 MG/1
TABLET ORAL
Qty: 90 TABLET | Refills: 4 | Status: SHIPPED | OUTPATIENT
Start: 2020-09-02 | End: 2021-11-24 | Stop reason: SDUPTHER

## 2020-09-18 ENCOUNTER — TELEPHONE (OUTPATIENT)
Dept: CARDIOLOGY | Facility: CLINIC | Age: 60
End: 2020-09-18

## 2020-09-18 DIAGNOSIS — I71.40 ABDOMINAL AORTIC ANEURYSM (AAA) WITHOUT RUPTURE: Primary | ICD-10-CM

## 2020-09-18 NOTE — TELEPHONE ENCOUNTER
----- Message from Zoya Youngblood sent at 9/18/2020 11:39 AM CDT -----  Regarding: advice  Contact: BEATRICE MARRUFO [[id number]]  Patient is requesting a call back from the nurse concerning CV Abdominal Aorta.  Please call the patient upon request at phone number [phone].

## 2020-09-18 NOTE — TELEPHONE ENCOUNTER
Salma calling to see when pt is scheduled to see Dr Doty so labs can be drawn before. Advised that appt is not until 10/9

## 2020-09-18 NOTE — TELEPHONE ENCOUNTER
----- Message from Mela Hill sent at 9/18/2020  2:12 PM CDT -----  Contact: pt  Type: Needs Medical Advice    Who Called:  Salma [facility] Call Back Number: [phone]  Additional Information: Requesting a call back regarding lab order for pt   Please Advise ---Thank you

## 2020-09-21 ENCOUNTER — TELEPHONE (OUTPATIENT)
Dept: NUTRITION | Facility: CLINIC | Age: 60
End: 2020-09-21

## 2020-09-21 NOTE — TELEPHONE ENCOUNTER
Returned pt's call - There was no answer and voicemail was full, unable to leave a message - Will try again tomorrow     ----- Message from April Guzman sent at 9/21/2020  2:46 PM CDT -----  Contact: self  Requesting call back regarding speaking with nurse to see if referral has been received form pt pcp. Pt provider name is Dr. De La Cruz. Please call back at [phone].

## 2020-10-02 ENCOUNTER — TELEPHONE (OUTPATIENT)
Dept: RADIOLOGY | Facility: HOSPITAL | Age: 60
End: 2020-10-02

## 2020-10-05 ENCOUNTER — HOSPITAL ENCOUNTER (OUTPATIENT)
Dept: RADIOLOGY | Facility: HOSPITAL | Age: 60
Discharge: HOME | End: 2020-10-05
Attending: INTERNAL MEDICINE
Payer: MEDICARE

## 2020-10-05 DIAGNOSIS — I71.40 ABDOMINAL AORTIC ANEURYSM (AAA) WITHOUT RUPTURE: ICD-10-CM

## 2020-10-05 PROCEDURE — 76775 US EXAM ABDO BACK WALL LIM: CPT | Mod: 26,,, | Performed by: RADIOLOGY

## 2020-10-05 PROCEDURE — 76775 US EXAM ABDO BACK WALL LIM: CPT | Mod: TC,PO

## 2020-10-05 PROCEDURE — 76775 US ABDOMINAL AORTA: ICD-10-PCS | Mod: 26,,, | Performed by: RADIOLOGY

## 2020-10-09 ENCOUNTER — OFFICE VISIT (OUTPATIENT)
Dept: CARDIOLOGY | Facility: CLINIC | Age: 60
End: 2020-10-09
Payer: MEDICARE

## 2020-10-09 DIAGNOSIS — E78.5 DYSLIPIDEMIA: Chronic | ICD-10-CM

## 2020-10-09 DIAGNOSIS — I70.1 ATHEROSCLEROTIC RAS (RENAL ARTERY STENOSIS), BILATERAL: Chronic | ICD-10-CM

## 2020-10-09 DIAGNOSIS — N18.32 STAGE 3B CHRONIC KIDNEY DISEASE: Chronic | ICD-10-CM

## 2020-10-09 DIAGNOSIS — I71.40 ABDOMINAL AORTIC ANEURYSM (AAA) WITHOUT RUPTURE: Chronic | ICD-10-CM

## 2020-10-09 DIAGNOSIS — I25.10 CORONARY ARTERY DISEASE INVOLVING NATIVE CORONARY ARTERY OF NATIVE HEART WITHOUT ANGINA PECTORIS: Chronic | ICD-10-CM

## 2020-10-09 DIAGNOSIS — I34.0 MITRAL VALVE INSUFFICIENCY, UNSPECIFIED ETIOLOGY: Chronic | ICD-10-CM

## 2020-10-09 DIAGNOSIS — I65.1 BASILAR ARTERY STENOSIS: ICD-10-CM

## 2020-10-09 DIAGNOSIS — I63.9 ACUTE CVA (CEREBROVASCULAR ACCIDENT): Primary | ICD-10-CM

## 2020-10-09 DIAGNOSIS — Z95.5 STENTED CORONARY ARTERY: Chronic | ICD-10-CM

## 2020-10-09 DIAGNOSIS — I10 HTN (HYPERTENSION), MALIGNANT: Chronic | ICD-10-CM

## 2020-10-09 PROCEDURE — 99213 PR OFFICE/OUTPT VISIT, EST, LEVL III, 20-29 MIN: ICD-10-PCS | Mod: 95,,, | Performed by: INTERNAL MEDICINE

## 2020-10-09 PROCEDURE — 99213 OFFICE O/P EST LOW 20 MIN: CPT | Mod: 95,,, | Performed by: INTERNAL MEDICINE

## 2020-10-09 NOTE — PROGRESS NOTES
Subjective:    Patient ID:  Leena Gibbs is a 60 y.o. female who presents for follow-up of No chief complaint on file.      HPI  Ms. Gibbs here for follow up of resistant HTN/small AAA/MR. Small CVA (2/2020).  blood pressure remains well controlled.  Denies any angina.  Has become losing weight.  Does all ADLs with no symptoms.      The patient location is: Home  The chief complaint leading to consultation is: MR  Visit type: Virtual visit with synchronous audio and video  Total time spent with patient: 10  Each patient to whom he or she provides medical services by telemedicine is:  (1) informed of the relationship between the physician and patient and the respective role of any other health care provider with respect to management of the patient; and (2) notified that he or she may decline to receive medical services by telemedicine and may withdraw from such care at any time.    Review of Systems   Constitution: Negative for malaise/fatigue.   Eyes: Negative for blurred vision.   Cardiovascular: Negative for chest pain, claudication, cyanosis, dyspnea on exertion, irregular heartbeat, leg swelling, near-syncope, orthopnea, palpitations, paroxysmal nocturnal dyspnea and syncope.   Respiratory: Negative for cough and shortness of breath.    Hematologic/Lymphatic: Does not bruise/bleed easily.   Musculoskeletal: Negative for back pain, falls, joint pain, muscle cramps, muscle weakness and myalgias.   Gastrointestinal: Negative for abdominal pain, change in bowel habit, nausea and vomiting.   Genitourinary: Negative for urgency.   Neurological: Negative for dizziness, focal weakness and light-headedness.       Past Medical History:   Diagnosis Date    AAA (abdominal aortic aneurysm)     Anticoagulant long-term use     plavix    Arthritis     Atherosclerotic DEJA (renal artery stenosis), bilateral 3/27/2015    2/2015 Rt renal- 40%, Lt renal - 30%     Blindness of left eye     CAD (coronary artery  disease) 3/27/2015    2/2015 LAD- 50% mid and distal, Cx- 20%, OM2 80%, smll branch 60%, RCA- non-dom 30%     CKD (chronic kidney disease) 01/17/2012    stage 3, seen by Dr Dorothy Beatty     Claustrophobia     per pt's chart, anesthesia needed for MRI    Colon polyp     Coronary artery disease     CVA (cerebral infarction)     x2; 12/27/2006 & 1/18/2010    Diverticulosis     DM (diabetes mellitus)     Dyslipidemia 1/17/2012    Dyspnea on exertion     Gout     HTN (hypertension), malignant 1/17/2012    11/15/2010 bilateral Renal, 10% stenosis     Hyperlipidemia     Hypertension     Kidney disease     stage 3, sees nephrologist Dr Beatty    Mitral regurgitation 1/17/2012 17/13 mod/sev     Mitral valve regurgitation     PONV (postoperative nausea and vomiting)     Sleep apnea with use of continuous positive airway pressure (CPAP)     Stented coronary artery 3/27/2015    2/2015 OM2- promus 2.75x30     Stroke 2006 / 2010    does not use any assistive devices     There are no hospital problems to display for this patient.       Objective:     There were no vitals filed for this visit.     Physical Exam          ..    Chemistry        Component Value Date/Time     02/04/2020 0633    K 4.4 02/04/2020 0633     02/04/2020 0633    CO2 24 02/04/2020 0633    BUN 31 (H) 02/04/2020 0633    CREATININE 1.88 (H) 02/04/2020 0633    GLU 89 02/04/2020 0633        Component Value Date/Time    CALCIUM 9.4 02/04/2020 0633    ALKPHOS 53 02/04/2020 0633    AST 26 02/04/2020 0633    AST 18 11/28/2015 1922    ALT 12 02/04/2020 0633    BILITOT 0.4 02/04/2020 0633    ESTGFRAFRICA 33 (A) 02/04/2020 0633    EGFRNONAA 29 (A) 02/04/2020 0633            ..  Lab Results   Component Value Date    CHOL 175 09/29/2020    CHOL 174 02/02/2020    CHOL 213 (H) 08/23/2019     Lab Results   Component Value Date    HDL 33 09/29/2020    HDL 31 (L) 02/02/2020    HDL 34 (L) 08/23/2019     Lab Results   Component Value Date     LDLCALC 113 (H) 09/29/2020    LDLCALC 113.8 02/02/2020    LDLCALC 135.6 08/23/2019     Lab Results   Component Value Date    TRIG 143 09/29/2020    TRIG 146 02/02/2020    TRIG 217 (H) 08/23/2019     Lab Results   Component Value Date    CHOLHDL 5.3 (H) 09/29/2020    CHOLHDL 17.8 (L) 02/02/2020    CHOLHDL 16.0 (L) 08/23/2019     ..  Lab Results   Component Value Date    WBC 5.89 02/04/2020    HGB 11.8 (L) 02/04/2020    HCT 38.1 02/04/2020    MCV 88 02/04/2020     02/04/2020       Test(s) Reviewed  I have reviewed the following in detail:  [] Stress test   [] Angiography   [x] Echocardiogram   [x] Labs   [x] Other:       Assessment:         ICD-10-CM ICD-9-CM   1. Acute CVA (cerebrovascular accident)  I63.9 434.91   2. Abdominal aortic aneurysm (AAA) without rupture  I71.4 441.4   3. Atherosclerotic DEJA (renal artery stenosis), bilateral  I70.1 440.1   4. Basilar artery stenosis  I65.1 433.00   5. Coronary artery disease involving native coronary artery of native heart without angina pectoris  I25.10 414.01   6. Dyslipidemia  E78.5 272.4   7. HTN (hypertension), malignant  I10 401.0   8. Stented coronary artery  Z95.5 V45.82   9. Mitral valve insufficiency, unspecified etiology  I34.0 424.0   10. Stage 3b chronic kidney disease  N18.32 585.3          Plan:           Return to clinic 7 months   Low level/low impact aerobic exercise 5x's/wk. Heart healthy diet and risk factor modification.    See labs and med orders.  Cfd/labs next visit    Portions of this note may have been created with voice recognition software.  Grammatical, syntax and spelling errors may be inevitable.

## 2021-02-05 DIAGNOSIS — Z95.5 STENTED CORONARY ARTERY: Chronic | ICD-10-CM

## 2021-02-05 DIAGNOSIS — I25.10 CORONARY ARTERY DISEASE DUE TO LIPID RICH PLAQUE: Chronic | ICD-10-CM

## 2021-02-05 DIAGNOSIS — I25.83 CORONARY ARTERY DISEASE DUE TO LIPID RICH PLAQUE: Chronic | ICD-10-CM

## 2021-02-05 DIAGNOSIS — I71.30 RUPTURED ABDOMINAL AORTIC ANEURYSM (AAA): Chronic | ICD-10-CM

## 2021-02-05 DIAGNOSIS — E78.5 DYSLIPIDEMIA: Chronic | ICD-10-CM

## 2021-02-05 DIAGNOSIS — I70.1 ATHEROSCLEROTIC RAS (RENAL ARTERY STENOSIS), BILATERAL: Chronic | ICD-10-CM

## 2021-02-05 DIAGNOSIS — I10 HTN (HYPERTENSION), MALIGNANT: Chronic | ICD-10-CM

## 2021-02-05 DIAGNOSIS — I34.0 NON-RHEUMATIC MITRAL REGURGITATION: Chronic | ICD-10-CM

## 2021-02-06 RX ORDER — ROSUVASTATIN CALCIUM 20 MG/1
20 TABLET, COATED ORAL NIGHTLY
Qty: 90 TABLET | Refills: 4 | Status: SHIPPED | OUTPATIENT
Start: 2021-02-06 | End: 2022-02-14 | Stop reason: SDUPTHER

## 2021-03-01 DIAGNOSIS — I10 HTN (HYPERTENSION), MALIGNANT: Chronic | ICD-10-CM

## 2021-03-01 RX ORDER — CLONIDINE HYDROCHLORIDE 0.1 MG/1
TABLET ORAL
Qty: 90 TABLET | Refills: 4 | Status: SHIPPED | OUTPATIENT
Start: 2021-03-01 | End: 2021-08-04

## 2021-03-24 ENCOUNTER — TELEPHONE (OUTPATIENT)
Dept: CARDIOLOGY | Facility: CLINIC | Age: 61
End: 2021-03-24

## 2021-04-06 ENCOUNTER — TELEPHONE (OUTPATIENT)
Dept: CARDIOLOGY | Facility: CLINIC | Age: 61
End: 2021-04-06

## 2021-04-12 ENCOUNTER — TELEPHONE (OUTPATIENT)
Dept: CARDIOLOGY | Facility: CLINIC | Age: 61
End: 2021-04-12

## 2021-04-21 ENCOUNTER — TELEPHONE (OUTPATIENT)
Dept: CARDIOLOGY | Facility: CLINIC | Age: 61
End: 2021-04-21

## 2021-05-03 ENCOUNTER — HOSPITAL ENCOUNTER (OUTPATIENT)
Dept: CARDIOLOGY | Facility: HOSPITAL | Age: 61
Discharge: HOME | End: 2021-05-03
Attending: INTERNAL MEDICINE
Payer: MEDICARE

## 2021-05-03 VITALS
HEART RATE: 72 BPM | HEIGHT: 64 IN | WEIGHT: 278 LBS | BODY MASS INDEX: 47.46 KG/M2 | SYSTOLIC BLOOD PRESSURE: 144 MMHG | DIASTOLIC BLOOD PRESSURE: 90 MMHG

## 2021-05-03 DIAGNOSIS — I10 HTN (HYPERTENSION), MALIGNANT: ICD-10-CM

## 2021-05-03 DIAGNOSIS — I70.1 ATHEROSCLEROTIC RAS (RENAL ARTERY STENOSIS), BILATERAL: ICD-10-CM

## 2021-05-03 DIAGNOSIS — I65.1 BASILAR ARTERY STENOSIS: ICD-10-CM

## 2021-05-03 DIAGNOSIS — I63.9 ACUTE CVA (CEREBROVASCULAR ACCIDENT): ICD-10-CM

## 2021-05-03 DIAGNOSIS — E78.5 DYSLIPIDEMIA: ICD-10-CM

## 2021-05-03 DIAGNOSIS — I25.10 CORONARY ARTERY DISEASE INVOLVING NATIVE CORONARY ARTERY OF NATIVE HEART WITHOUT ANGINA PECTORIS: ICD-10-CM

## 2021-05-03 DIAGNOSIS — Z95.5 STENTED CORONARY ARTERY: ICD-10-CM

## 2021-05-03 DIAGNOSIS — I34.0 MITRAL VALVE INSUFFICIENCY, UNSPECIFIED ETIOLOGY: ICD-10-CM

## 2021-05-03 LAB
AORTIC ROOT ANNULUS: 3.08 CM
AV INDEX (PROSTH): 0.61
AV MEAN GRADIENT: 11 MMHG
AV PEAK GRADIENT: 16 MMHG
AV VALVE AREA: 1.92 CM2
AV VELOCITY RATIO: 0.65
BSA FOR ECHO PROCEDURE: 2.39 M2
CV ECHO LV RWT: 0.47 CM
DOP CALC AO PEAK VEL: 2.03 M/S
DOP CALC AO VTI: 40.3 CM
DOP CALC LVOT AREA: 3.2 CM2
DOP CALC LVOT DIAMETER: 2.01 CM
DOP CALC LVOT PEAK VEL: 1.31 M/S
DOP CALC LVOT STROKE VOLUME: 77.54 CM3
DOP CALC RVOT PEAK VEL: 0.97 M/S
DOP CALC RVOT VTI: 18.34 CM
DOP CALCLVOT PEAK VEL VTI: 24.45 CM
E WAVE DECELERATION TIME: 122.02 MSEC
E/A RATIO: 0.75
E/E' RATIO: 15.5 M/S
ECHO LV POSTERIOR WALL: 1.2 CM (ref 0.6–1.1)
EJECTION FRACTION: 55 %
FRACTIONAL SHORTENING: 35 % (ref 28–44)
INTERVENTRICULAR SEPTUM: 1.4 CM (ref 0.6–1.1)
IVRT: 108.47 MSEC
LA MAJOR: 5.84 CM
LA MINOR: 5.68 CM
LA WIDTH: 4.24 CM
LEFT ATRIUM SIZE: 4.52 CM
LEFT ATRIUM VOLUME INDEX: 41.7 ML/M2
LEFT ATRIUM VOLUME: 93.81 CM3
LEFT INTERNAL DIMENSION IN SYSTOLE: 3.31 CM (ref 2.1–4)
LEFT VENTRICLE DIASTOLIC VOLUME INDEX: 54.72 ML/M2
LEFT VENTRICLE DIASTOLIC VOLUME: 123.13 ML
LEFT VENTRICLE MASS INDEX: 120 G/M2
LEFT VENTRICLE SYSTOLIC VOLUME INDEX: 19.7 ML/M2
LEFT VENTRICLE SYSTOLIC VOLUME: 44.41 ML
LEFT VENTRICULAR INTERNAL DIMENSION IN DIASTOLE: 5.09 CM (ref 3.5–6)
LEFT VENTRICULAR MASS: 269.24 G
LV LATERAL E/E' RATIO: 15.5 M/S
LV SEPTAL E/E' RATIO: 15.5 M/S
MV A" WAVE DURATION": 9.42 MSEC
MV PEAK A VEL: 1.24 M/S
MV PEAK E VEL: 0.93 M/S
MV STENOSIS PRESSURE HALF TIME: 35.39 MS
MV VALVE AREA P 1/2 METHOD: 6.22 CM2
PISA TR MAX VEL: 2.78 M/S
PULM VEIN S/D RATIO: 1.74
PV MEAN GRADIENT: 2 MMHG
PV PEAK D VEL: 0.39 M/S
PV PEAK S VEL: 0.68 M/S
PV PEAK VELOCITY: 1.08 CM/S
RA MAJOR: 4.39 CM
RA PRESSURE: 3 MMHG
RA WIDTH: 2.63 CM
RIGHT VENTRICULAR END-DIASTOLIC DIMENSION: 2.26 CM
SINUS: 2.44 CM
STJ: 2.4 CM
TDI LATERAL: 0.06 M/S
TDI SEPTAL: 0.06 M/S
TDI: 0.06 M/S
TR MAX PG: 31 MMHG
TRICUSPID ANNULAR PLANE SYSTOLIC EXCURSION: 2.05 CM
TV REST PULMONARY ARTERY PRESSURE: 34 MMHG

## 2021-05-03 PROCEDURE — 93306 TTE W/DOPPLER COMPLETE: CPT | Mod: PO

## 2021-05-03 PROCEDURE — 93306 TTE W/DOPPLER COMPLETE: CPT | Mod: 26,,, | Performed by: INTERNAL MEDICINE

## 2021-05-03 PROCEDURE — 93306 ECHO (CUPID ONLY): ICD-10-PCS | Mod: 26,,, | Performed by: INTERNAL MEDICINE

## 2021-05-05 DIAGNOSIS — I34.0 MITRAL VALVE INSUFFICIENCY, UNSPECIFIED ETIOLOGY: ICD-10-CM

## 2021-05-05 DIAGNOSIS — I25.10 CORONARY ARTERY DISEASE INVOLVING NATIVE CORONARY ARTERY OF NATIVE HEART WITHOUT ANGINA PECTORIS: Chronic | ICD-10-CM

## 2021-05-05 DIAGNOSIS — I10 HTN (HYPERTENSION), MALIGNANT: Chronic | ICD-10-CM

## 2021-05-05 DIAGNOSIS — E78.5 DYSLIPIDEMIA: Chronic | ICD-10-CM

## 2021-05-05 DIAGNOSIS — I71.40 ABDOMINAL AORTIC ANEURYSM WITHOUT RUPTURE: Chronic | ICD-10-CM

## 2021-05-05 DIAGNOSIS — Z95.5 STENTED CORONARY ARTERY: Chronic | ICD-10-CM

## 2021-05-05 DIAGNOSIS — I70.1 ATHEROSCLEROTIC RAS (RENAL ARTERY STENOSIS), BILATERAL: Chronic | ICD-10-CM

## 2021-05-05 RX ORDER — CARVEDILOL 25 MG/1
TABLET ORAL
Qty: 180 TABLET | Refills: 4 | Status: SHIPPED | OUTPATIENT
Start: 2021-05-05 | End: 2022-05-16

## 2021-05-06 ENCOUNTER — PATIENT MESSAGE (OUTPATIENT)
Dept: RESEARCH | Facility: HOSPITAL | Age: 61
End: 2021-05-06

## 2021-05-17 ENCOUNTER — OFFICE VISIT (OUTPATIENT)
Dept: CARDIOLOGY | Facility: CLINIC | Age: 61
End: 2021-05-17
Payer: MEDICARE

## 2021-05-17 VITALS
BODY MASS INDEX: 49.53 KG/M2 | HEART RATE: 80 BPM | HEIGHT: 64 IN | WEIGHT: 290.13 LBS | DIASTOLIC BLOOD PRESSURE: 87 MMHG | SYSTOLIC BLOOD PRESSURE: 122 MMHG

## 2021-05-17 DIAGNOSIS — I10 HTN (HYPERTENSION), MALIGNANT: Chronic | ICD-10-CM

## 2021-05-17 DIAGNOSIS — E11.22 TYPE 2 DIABETES MELLITUS WITH STAGE 3 CHRONIC KIDNEY DISEASE, UNSPECIFIED WHETHER LONG TERM INSULIN USE, UNSPECIFIED WHETHER STAGE 3A OR 3B CKD: ICD-10-CM

## 2021-05-17 DIAGNOSIS — N18.30 TYPE 2 DIABETES MELLITUS WITH STAGE 3 CHRONIC KIDNEY DISEASE, UNSPECIFIED WHETHER LONG TERM INSULIN USE, UNSPECIFIED WHETHER STAGE 3A OR 3B CKD: ICD-10-CM

## 2021-05-17 DIAGNOSIS — I71.40 ABDOMINAL AORTIC ANEURYSM (AAA) WITHOUT RUPTURE: Primary | ICD-10-CM

## 2021-05-17 DIAGNOSIS — I34.0 MITRAL VALVE INSUFFICIENCY, UNSPECIFIED ETIOLOGY: Chronic | ICD-10-CM

## 2021-05-17 DIAGNOSIS — N18.32 STAGE 3B CHRONIC KIDNEY DISEASE: Chronic | ICD-10-CM

## 2021-05-17 DIAGNOSIS — I70.1 ATHEROSCLEROTIC RAS (RENAL ARTERY STENOSIS), BILATERAL: Chronic | ICD-10-CM

## 2021-05-17 DIAGNOSIS — E78.5 DYSLIPIDEMIA: Chronic | ICD-10-CM

## 2021-05-17 DIAGNOSIS — I25.10 CORONARY ARTERY DISEASE INVOLVING NATIVE CORONARY ARTERY OF NATIVE HEART WITHOUT ANGINA PECTORIS: Chronic | ICD-10-CM

## 2021-05-17 PROCEDURE — 99213 OFFICE O/P EST LOW 20 MIN: CPT | Mod: PBBFAC,PO | Performed by: PHYSICIAN ASSISTANT

## 2021-05-17 PROCEDURE — 99999 PR PBB SHADOW E&M-EST. PATIENT-LVL III: ICD-10-PCS | Mod: PBBFAC,,, | Performed by: PHYSICIAN ASSISTANT

## 2021-05-17 PROCEDURE — 99214 PR OFFICE/OUTPT VISIT, EST, LEVL IV, 30-39 MIN: ICD-10-PCS | Mod: S$PBB,,, | Performed by: PHYSICIAN ASSISTANT

## 2021-05-17 PROCEDURE — 99999 PR PBB SHADOW E&M-EST. PATIENT-LVL III: CPT | Mod: PBBFAC,,, | Performed by: PHYSICIAN ASSISTANT

## 2021-05-17 PROCEDURE — 99214 OFFICE O/P EST MOD 30 MIN: CPT | Mod: S$PBB,,, | Performed by: PHYSICIAN ASSISTANT

## 2021-05-17 RX ORDER — FERROUS GLUCONATE 324(38)MG
324 TABLET ORAL
COMMUNITY
End: 2021-10-26

## 2021-07-01 ENCOUNTER — TELEPHONE (OUTPATIENT)
Dept: GASTROENTEROLOGY | Facility: CLINIC | Age: 61
End: 2021-07-01

## 2021-07-06 ENCOUNTER — TELEPHONE (OUTPATIENT)
Dept: GASTROENTEROLOGY | Facility: CLINIC | Age: 61
End: 2021-07-06

## 2021-07-14 ENCOUNTER — TELEPHONE (OUTPATIENT)
Dept: GASTROENTEROLOGY | Facility: CLINIC | Age: 61
End: 2021-07-14

## 2021-07-29 ENCOUNTER — OFFICE VISIT (OUTPATIENT)
Dept: GASTROENTEROLOGY | Facility: CLINIC | Age: 61
End: 2021-07-29
Payer: MEDICARE

## 2021-07-29 ENCOUNTER — TELEPHONE (OUTPATIENT)
Dept: GASTROENTEROLOGY | Facility: CLINIC | Age: 61
End: 2021-07-29

## 2021-07-29 VITALS — WEIGHT: 293 LBS | BODY MASS INDEX: 50.02 KG/M2 | HEIGHT: 64 IN

## 2021-07-29 DIAGNOSIS — F11.90 OPIOID USE: ICD-10-CM

## 2021-07-29 DIAGNOSIS — K59.00 CONSTIPATION, UNSPECIFIED CONSTIPATION TYPE: ICD-10-CM

## 2021-07-29 DIAGNOSIS — R79.89 ABNORMAL TSH: ICD-10-CM

## 2021-07-29 DIAGNOSIS — R19.4 CHANGE IN BOWEL HABITS: Primary | ICD-10-CM

## 2021-07-29 DIAGNOSIS — Z79.01 ANTICOAGULANT LONG-TERM USE: ICD-10-CM

## 2021-07-29 PROCEDURE — 99214 PR OFFICE/OUTPT VISIT, EST, LEVL IV, 30-39 MIN: ICD-10-PCS | Mod: S$PBB,,, | Performed by: NURSE PRACTITIONER

## 2021-07-29 PROCEDURE — 99999 PR PBB SHADOW E&M-EST. PATIENT-LVL III: CPT | Mod: PBBFAC,,, | Performed by: NURSE PRACTITIONER

## 2021-07-29 PROCEDURE — 99999 PR PBB SHADOW E&M-EST. PATIENT-LVL III: ICD-10-PCS | Mod: PBBFAC,,, | Performed by: NURSE PRACTITIONER

## 2021-07-29 PROCEDURE — 99214 OFFICE O/P EST MOD 30 MIN: CPT | Mod: S$PBB,,, | Performed by: NURSE PRACTITIONER

## 2021-07-29 PROCEDURE — 99213 OFFICE O/P EST LOW 20 MIN: CPT | Mod: PBBFAC,PO | Performed by: NURSE PRACTITIONER

## 2021-07-29 RX ORDER — ERGOCALCIFEROL 1.25 MG/1
50000 CAPSULE ORAL
COMMUNITY

## 2021-07-29 RX ORDER — HYDROCODONE BITARTRATE AND ACETAMINOPHEN 7.5; 325 MG/1; MG/1
TABLET ORAL
COMMUNITY
End: 2022-12-30

## 2021-07-29 RX ORDER — BLOOD-GLUCOSE CONTROL, NORMAL
EACH MISCELLANEOUS
COMMUNITY

## 2021-07-29 RX ORDER — LACTULOSE 10 G/15ML
SOLUTION ORAL; RECTAL
COMMUNITY
Start: 2021-06-03 | End: 2021-07-29

## 2021-07-29 RX ORDER — TRAMADOL HYDROCHLORIDE 50 MG/1
TABLET ORAL
COMMUNITY
End: 2023-06-23

## 2021-07-29 RX ORDER — ROSUVASTATIN CALCIUM 20 MG/1
TABLET, COATED ORAL
COMMUNITY
End: 2021-07-29 | Stop reason: SDUPTHER

## 2021-07-29 RX ORDER — TRIAMTERENE/HYDROCHLOROTHIAZID 37.5-25 MG
TABLET ORAL
COMMUNITY
End: 2022-12-30

## 2021-07-29 RX ORDER — AMLODIPINE BESYLATE 10 MG/1
10 TABLET ORAL
COMMUNITY
End: 2021-07-29 | Stop reason: SDUPTHER

## 2021-07-29 RX ORDER — CLONIDINE HYDROCHLORIDE 0.1 MG/1
TABLET ORAL
COMMUNITY
End: 2021-07-29 | Stop reason: SDUPTHER

## 2021-07-29 RX ORDER — CALCITRIOL 0.25 UG/1
0.5 CAPSULE ORAL
COMMUNITY
End: 2021-07-29 | Stop reason: SDUPTHER

## 2021-07-29 RX ORDER — FESOTERODINE FUMARATE 8 MG/1
TABLET, FILM COATED, EXTENDED RELEASE ORAL
COMMUNITY
End: 2022-02-17 | Stop reason: ALTCHOICE

## 2021-07-29 RX ORDER — FEBUXOSTAT 80 MG/1
TABLET, FILM COATED ORAL
COMMUNITY
End: 2021-10-26

## 2021-07-29 RX ORDER — LOSARTAN POTASSIUM 50 MG/1
TABLET ORAL
COMMUNITY
End: 2021-10-26

## 2021-07-29 RX ORDER — DOCUSATE SODIUM 100 MG/1
100 CAPSULE, LIQUID FILLED ORAL 2 TIMES DAILY
COMMUNITY

## 2021-07-29 RX ORDER — METHYLPREDNISOLONE 4 MG/1
TABLET ORAL
COMMUNITY
Start: 2021-04-29 | End: 2022-12-30

## 2021-07-29 RX ORDER — CLOPIDOGREL BISULFATE 75 MG/1
75 TABLET ORAL
COMMUNITY
End: 2021-07-29 | Stop reason: SDUPTHER

## 2021-07-29 RX ORDER — DICLOFENAC SODIUM 10 MG/G
GEL TOPICAL
COMMUNITY
Start: 2021-07-28 | End: 2021-10-26

## 2021-07-29 RX ORDER — TIMOLOL MALEATE 5 MG/ML
1 SOLUTION/ DROPS OPHTHALMIC 2 TIMES DAILY
COMMUNITY
Start: 2021-06-05

## 2021-07-29 RX ORDER — LISINOPRIL 20 MG/1
TABLET ORAL
COMMUNITY
End: 2023-06-23

## 2021-07-29 RX ORDER — CYCLOBENZAPRINE HCL 5 MG
5 TABLET ORAL
COMMUNITY
End: 2021-10-26

## 2021-07-29 RX ORDER — ISOSORBIDE MONONITRATE 60 MG/1
TABLET, EXTENDED RELEASE ORAL
COMMUNITY

## 2021-07-29 RX ORDER — INSULIN GLARGINE 100 [IU]/ML
INJECTION, SOLUTION SUBCUTANEOUS
COMMUNITY
End: 2023-06-23

## 2021-07-29 RX ORDER — TIZANIDINE 4 MG/1
TABLET ORAL
COMMUNITY
End: 2022-12-30

## 2021-08-04 DIAGNOSIS — I10 HTN (HYPERTENSION), MALIGNANT: Chronic | ICD-10-CM

## 2021-08-04 RX ORDER — CLONIDINE HYDROCHLORIDE 0.1 MG/1
TABLET ORAL
Qty: 90 TABLET | Refills: 4 | Status: SHIPPED | OUTPATIENT
Start: 2021-08-04 | End: 2022-01-19

## 2021-09-03 ENCOUNTER — TELEPHONE (OUTPATIENT)
Dept: GASTROENTEROLOGY | Facility: CLINIC | Age: 61
End: 2021-09-03

## 2021-10-08 ENCOUNTER — TELEPHONE (OUTPATIENT)
Dept: UROLOGY | Facility: CLINIC | Age: 61
End: 2021-10-08

## 2021-10-12 ENCOUNTER — HOSPITAL ENCOUNTER (OUTPATIENT)
Dept: RADIOLOGY | Facility: HOSPITAL | Age: 61
Discharge: HOME | End: 2021-10-12
Attending: PHYSICIAN ASSISTANT
Payer: MEDICARE

## 2021-10-12 DIAGNOSIS — I71.40 ABDOMINAL AORTIC ANEURYSM (AAA) WITHOUT RUPTURE: ICD-10-CM

## 2021-10-12 PROCEDURE — 76775 US EXAM ABDO BACK WALL LIM: CPT | Mod: 26,,, | Performed by: RADIOLOGY

## 2021-10-12 PROCEDURE — 76775 US EXAM ABDO BACK WALL LIM: CPT | Mod: TC,PO

## 2021-10-12 PROCEDURE — 76775 US ABDOMINAL AORTA: ICD-10-PCS | Mod: 26,,, | Performed by: RADIOLOGY

## 2021-10-15 ENCOUNTER — TELEPHONE (OUTPATIENT)
Dept: CARDIOLOGY | Facility: CLINIC | Age: 61
End: 2021-10-15

## 2021-10-15 RX ORDER — AMOXICILLIN AND CLAVULANATE POTASSIUM 875; 125 MG/1; MG/1
1 TABLET, FILM COATED ORAL 2 TIMES DAILY
COMMUNITY
Start: 2021-10-01 | End: 2023-06-23

## 2021-10-18 ENCOUNTER — OFFICE VISIT (OUTPATIENT)
Dept: UROLOGY | Facility: CLINIC | Age: 61
End: 2021-10-18
Payer: MEDICARE

## 2021-10-18 VITALS — BODY MASS INDEX: 50.02 KG/M2 | WEIGHT: 293 LBS | HEIGHT: 64 IN

## 2021-10-18 DIAGNOSIS — N30.90 CYSTITIS: ICD-10-CM

## 2021-10-18 DIAGNOSIS — R30.0 DYSURIA: Primary | ICD-10-CM

## 2021-10-18 LAB
BILIRUB SERPL-MCNC: ABNORMAL MG/DL
BLOOD URINE, POC: ABNORMAL
CLARITY, POC UA: ABNORMAL
COLOR, POC UA: YELLOW
GLUCOSE UR QL STRIP: ABNORMAL
KETONES UR QL STRIP: ABNORMAL
LEUKOCYTE ESTERASE URINE, POC: ABNORMAL
NITRITE, POC UA: POSITIVE
PH, POC UA: 6
PROTEIN, POC: 100
SPECIFIC GRAVITY, POC UA: 1.02
UROBILINOGEN, POC UA: 0.2

## 2021-10-18 PROCEDURE — 99999 PR PBB SHADOW E&M-EST. PATIENT-LVL IV: ICD-10-PCS | Mod: PBBFAC,,, | Performed by: UROLOGY

## 2021-10-18 PROCEDURE — 99999 PR PBB SHADOW E&M-EST. PATIENT-LVL IV: CPT | Mod: PBBFAC,,, | Performed by: UROLOGY

## 2021-10-18 PROCEDURE — 81002 URINALYSIS NONAUTO W/O SCOPE: CPT | Mod: PBBFAC,PO | Performed by: UROLOGY

## 2021-10-18 PROCEDURE — 87086 URINE CULTURE/COLONY COUNT: CPT | Performed by: UROLOGY

## 2021-10-18 PROCEDURE — 87077 CULTURE AEROBIC IDENTIFY: CPT | Performed by: UROLOGY

## 2021-10-18 PROCEDURE — 99214 OFFICE O/P EST MOD 30 MIN: CPT | Mod: PBBFAC,PO | Performed by: UROLOGY

## 2021-10-18 PROCEDURE — 87186 SC STD MICRODIL/AGAR DIL: CPT | Performed by: UROLOGY

## 2021-10-18 PROCEDURE — 87088 URINE BACTERIA CULTURE: CPT | Performed by: UROLOGY

## 2021-10-18 PROCEDURE — 99213 PR OFFICE/OUTPT VISIT, EST, LEVL III, 20-29 MIN: ICD-10-PCS | Mod: S$PBB,,, | Performed by: UROLOGY

## 2021-10-18 PROCEDURE — 99213 OFFICE O/P EST LOW 20 MIN: CPT | Mod: S$PBB,,, | Performed by: UROLOGY

## 2021-10-18 RX ORDER — LEVOFLOXACIN 500 MG/1
500 TABLET, FILM COATED ORAL DAILY
Qty: 7 TABLET | Refills: 0 | Status: SHIPPED | OUTPATIENT
Start: 2021-10-18 | End: 2021-10-25

## 2021-10-21 ENCOUNTER — PATIENT MESSAGE (OUTPATIENT)
Dept: UROLOGY | Facility: CLINIC | Age: 61
End: 2021-10-21
Payer: MEDICARE

## 2021-10-21 LAB — BACTERIA UR CULT: ABNORMAL

## 2021-10-26 ENCOUNTER — CLINICAL SUPPORT (OUTPATIENT)
Dept: UROLOGY | Facility: CLINIC | Age: 61
End: 2021-10-26
Payer: MEDICARE

## 2021-10-26 ENCOUNTER — OFFICE VISIT (OUTPATIENT)
Dept: CARDIOLOGY | Facility: CLINIC | Age: 61
End: 2021-10-26
Payer: MEDICARE

## 2021-10-26 VITALS
HEART RATE: 71 BPM | HEIGHT: 64 IN | BODY MASS INDEX: 50.02 KG/M2 | SYSTOLIC BLOOD PRESSURE: 128 MMHG | DIASTOLIC BLOOD PRESSURE: 85 MMHG | WEIGHT: 293 LBS

## 2021-10-26 DIAGNOSIS — E78.5 DYSLIPIDEMIA: Chronic | ICD-10-CM

## 2021-10-26 DIAGNOSIS — I34.0 MITRAL VALVE INSUFFICIENCY, UNSPECIFIED ETIOLOGY: Chronic | ICD-10-CM

## 2021-10-26 DIAGNOSIS — R30.0 DYSURIA: Primary | ICD-10-CM

## 2021-10-26 DIAGNOSIS — I71.40 ABDOMINAL AORTIC ANEURYSM (AAA) WITHOUT RUPTURE: Chronic | ICD-10-CM

## 2021-10-26 DIAGNOSIS — I25.10 CORONARY ARTERY DISEASE INVOLVING NATIVE CORONARY ARTERY OF NATIVE HEART WITHOUT ANGINA PECTORIS: Chronic | ICD-10-CM

## 2021-10-26 DIAGNOSIS — Z95.5 STENTED CORONARY ARTERY: Chronic | ICD-10-CM

## 2021-10-26 DIAGNOSIS — I10 HTN (HYPERTENSION), MALIGNANT: Primary | Chronic | ICD-10-CM

## 2021-10-26 DIAGNOSIS — I65.1 BASILAR ARTERY STENOSIS: ICD-10-CM

## 2021-10-26 DIAGNOSIS — I63.9 ACUTE CVA (CEREBROVASCULAR ACCIDENT): ICD-10-CM

## 2021-10-26 DIAGNOSIS — I70.1 ATHEROSCLEROTIC RAS (RENAL ARTERY STENOSIS), BILATERAL: Chronic | ICD-10-CM

## 2021-10-26 PROCEDURE — 99214 OFFICE O/P EST MOD 30 MIN: CPT | Mod: S$PBB,,, | Performed by: INTERNAL MEDICINE

## 2021-10-26 PROCEDURE — 99214 OFFICE O/P EST MOD 30 MIN: CPT | Mod: PBBFAC,PO | Performed by: INTERNAL MEDICINE

## 2021-10-26 PROCEDURE — 99999 PR PBB SHADOW E&M-EST. PATIENT-LVL IV: ICD-10-PCS | Mod: PBBFAC,,, | Performed by: INTERNAL MEDICINE

## 2021-10-26 PROCEDURE — 87086 URINE CULTURE/COLONY COUNT: CPT | Performed by: UROLOGY

## 2021-10-26 PROCEDURE — 99999 PR PBB SHADOW E&M-EST. PATIENT-LVL IV: CPT | Mod: PBBFAC,,, | Performed by: INTERNAL MEDICINE

## 2021-10-26 PROCEDURE — 99214 PR OFFICE/OUTPT VISIT, EST, LEVL IV, 30-39 MIN: ICD-10-PCS | Mod: S$PBB,,, | Performed by: INTERNAL MEDICINE

## 2021-10-26 RX ORDER — IRON POLYSACCHARIDE COMPLEX 150 MG
150 CAPSULE ORAL DAILY
Qty: 90 CAPSULE | Refills: 4 | Status: SHIPPED | OUTPATIENT
Start: 2021-10-26

## 2021-10-27 LAB — BACTERIA UR CULT: NO GROWTH

## 2021-10-28 ENCOUNTER — TELEPHONE (OUTPATIENT)
Dept: UROLOGY | Facility: CLINIC | Age: 61
End: 2021-10-28
Payer: MEDICARE

## 2021-11-24 DIAGNOSIS — I10 HTN (HYPERTENSION), MALIGNANT: Primary | ICD-10-CM

## 2021-11-24 RX ORDER — AMLODIPINE BESYLATE 10 MG/1
10 TABLET ORAL NIGHTLY
Qty: 30 TABLET | Refills: 4 | Status: SHIPPED | OUTPATIENT
Start: 2021-11-24 | End: 2022-03-16

## 2022-01-18 ENCOUNTER — TELEPHONE (OUTPATIENT)
Dept: GASTROENTEROLOGY | Facility: CLINIC | Age: 62
End: 2022-01-18
Payer: MEDICARE

## 2022-01-18 DIAGNOSIS — K59.00 CONSTIPATION, UNSPECIFIED CONSTIPATION TYPE: ICD-10-CM

## 2022-01-18 RX ORDER — LINACLOTIDE 145 UG/1
CAPSULE, GELATIN COATED ORAL
Qty: 30 CAPSULE | Refills: 0 | Status: SHIPPED | OUTPATIENT
Start: 2022-01-18 | End: 2023-06-14

## 2022-01-18 NOTE — TELEPHONE ENCOUNTER
Please inform the patient that I refilled the prescription for linzess and patient is due for a follow-up visit for continued evaluation and management.  Thanks  ARGENIS

## 2022-02-14 DIAGNOSIS — I10 HTN (HYPERTENSION), MALIGNANT: Chronic | ICD-10-CM

## 2022-02-14 DIAGNOSIS — E78.5 DYSLIPIDEMIA: Chronic | ICD-10-CM

## 2022-02-14 DIAGNOSIS — I34.0 NON-RHEUMATIC MITRAL REGURGITATION: Chronic | ICD-10-CM

## 2022-02-14 DIAGNOSIS — Z95.5 STENTED CORONARY ARTERY: Chronic | ICD-10-CM

## 2022-02-14 DIAGNOSIS — I70.1 ATHEROSCLEROTIC RAS (RENAL ARTERY STENOSIS), BILATERAL: Chronic | ICD-10-CM

## 2022-02-14 DIAGNOSIS — I71.30 RUPTURED ABDOMINAL AORTIC ANEURYSM (AAA): Chronic | ICD-10-CM

## 2022-02-14 DIAGNOSIS — I25.10 CORONARY ARTERY DISEASE DUE TO LIPID RICH PLAQUE: Chronic | ICD-10-CM

## 2022-02-14 DIAGNOSIS — I25.83 CORONARY ARTERY DISEASE DUE TO LIPID RICH PLAQUE: Chronic | ICD-10-CM

## 2022-02-14 NOTE — TELEPHONE ENCOUNTER
----- Message from Lourdes Hicks sent at 2/14/2022  2:26 PM CST -----  Contact: pt  Type:  RX Refill Request    Who Called:  pt   Refill or New Rx:  refill   RX Name and Strength:  rosuvastatin (CRESTOR) 20 MG tablet  Is this a 30 day or 90 day RX:  30  Preferred Pharmacy with phone number:    [facility] [address] [address]  Phone: [phone] Fax: [phone]    Local or Mail Order:  local   Ordering Provider:  Lalitha Power Call Back Number:  [phone]    Additional Information:  pt carley russell out of meds

## 2022-02-16 RX ORDER — ROSUVASTATIN CALCIUM 20 MG/1
20 TABLET, COATED ORAL NIGHTLY
Qty: 90 TABLET | Refills: 4 | Status: SHIPPED | OUTPATIENT
Start: 2022-02-16 | End: 2023-03-06

## 2022-02-16 RX ORDER — ROSUVASTATIN CALCIUM 20 MG/1
20 TABLET, COATED ORAL NIGHTLY
Qty: 90 TABLET | Refills: 4 | Status: SHIPPED | OUTPATIENT
Start: 2022-02-16 | End: 2022-12-30

## 2022-02-17 ENCOUNTER — TELEPHONE (OUTPATIENT)
Dept: UROLOGY | Facility: CLINIC | Age: 62
End: 2022-02-17
Payer: MEDICARE

## 2022-02-17 RX ORDER — OXYBUTYNIN CHLORIDE 10 MG/1
10 TABLET, EXTENDED RELEASE ORAL DAILY
Qty: 30 TABLET | Refills: 11 | Status: SHIPPED | OUTPATIENT
Start: 2022-02-17 | End: 2022-12-30

## 2022-02-17 NOTE — TELEPHONE ENCOUNTER
----- Message from Марина Farias, Patient Care Assistant sent at 2/17/2022  1:38 PM CST -----  Regarding: advice  Contact: pt  Type: Needs Medical Advice  Who Called:  pt   Best Call Back Number: [phone] (home)     Additional Information: pt states she would like a callback regarding fesoterodine (TOVIAZ) 8 mg Tb24. Thanks!

## 2022-02-17 NOTE — TELEPHONE ENCOUNTER
----- Message from Ally Stewart sent at 2/17/2022 12:52 PM CST -----  Regarding: Call back  Contact: [phone]  Type:  Patient Call Back    Who Called:pt  What this is regarding?:insurance no longer covering fesoterodine (TOVIAZ) 8 mg Tb24  Would the patient rather a call back or a response via [employer]? Call back  Best Call Back Number:[phone]  Additional Information:     Advised to call back directly if there are further questions, or if these symptoms fail to improve as anticipated or worsen.

## 2022-02-17 NOTE — TELEPHONE ENCOUNTER
Patient's insurance no longer covers toviaz and she cannot afford myrbetriq. Please advise on different medication to send.

## 2022-02-17 NOTE — TELEPHONE ENCOUNTER
----- Message from Flori Vazquez sent at 2/17/2022  9:34 AM CST -----  Type:  RX Refill Request    Who Called:  pt  Refill or New Rx:  refill  RX Name and Strength:  fesoterodine (TOVIAZ) 8 mg Tb24  Best Call Back Number:  [phone] (home)     Additional Information:  This medication is not covered by her insurance any longer and she needs an alternative. Would like a call from the nurse.

## 2022-02-18 NOTE — TELEPHONE ENCOUNTER
Spoke with patient, advised her Dr Yen has sent in oxybutynin to her pharmacy as Toviaz is no longer covered per patient. Patient expressed understanding.

## 2022-03-14 DIAGNOSIS — I10 HTN (HYPERTENSION), MALIGNANT: ICD-10-CM

## 2022-03-16 RX ORDER — AMLODIPINE BESYLATE 10 MG/1
TABLET ORAL
Qty: 30 TABLET | Refills: 4 | Status: SHIPPED | OUTPATIENT
Start: 2022-03-16 | End: 2022-08-12

## 2022-06-13 DIAGNOSIS — I10 HTN (HYPERTENSION), MALIGNANT: Chronic | ICD-10-CM

## 2022-06-15 RX ORDER — CLONIDINE HYDROCHLORIDE 0.1 MG/1
TABLET ORAL
Qty: 90 TABLET | Refills: 4 | Status: SHIPPED | OUTPATIENT
Start: 2022-06-15 | End: 2022-12-30 | Stop reason: SDUPTHER

## 2022-06-24 ENCOUNTER — HOSPITAL ENCOUNTER (OUTPATIENT)
Dept: CARDIOLOGY | Facility: HOSPITAL | Age: 62
Discharge: HOME | End: 2022-06-24
Attending: INTERNAL MEDICINE
Payer: MEDICARE

## 2022-06-24 VITALS
SYSTOLIC BLOOD PRESSURE: 122 MMHG | BODY MASS INDEX: 49.51 KG/M2 | WEIGHT: 290 LBS | HEART RATE: 70 BPM | HEIGHT: 64 IN | DIASTOLIC BLOOD PRESSURE: 74 MMHG

## 2022-06-24 DIAGNOSIS — Z95.5 STENTED CORONARY ARTERY: ICD-10-CM

## 2022-06-24 DIAGNOSIS — I71.40 ABDOMINAL AORTIC ANEURYSM (AAA) WITHOUT RUPTURE: ICD-10-CM

## 2022-06-24 DIAGNOSIS — I10 HTN (HYPERTENSION), MALIGNANT: ICD-10-CM

## 2022-06-24 DIAGNOSIS — I65.1 BASILAR ARTERY STENOSIS: ICD-10-CM

## 2022-06-24 DIAGNOSIS — I34.0 MITRAL VALVE INSUFFICIENCY, UNSPECIFIED ETIOLOGY: ICD-10-CM

## 2022-06-24 DIAGNOSIS — E78.5 DYSLIPIDEMIA: ICD-10-CM

## 2022-06-24 DIAGNOSIS — I25.10 CORONARY ARTERY DISEASE INVOLVING NATIVE CORONARY ARTERY OF NATIVE HEART WITHOUT ANGINA PECTORIS: ICD-10-CM

## 2022-06-24 DIAGNOSIS — I63.9 ACUTE CVA (CEREBROVASCULAR ACCIDENT): ICD-10-CM

## 2022-06-24 DIAGNOSIS — I70.1 ATHEROSCLEROTIC RAS (RENAL ARTERY STENOSIS), BILATERAL: ICD-10-CM

## 2022-06-24 LAB
AORTIC ROOT ANNULUS: 2.72 CM
ASCENDING AORTA: 2.83 CM
AV INDEX (PROSTH): 0.55
AV MEAN GRADIENT: 5 MMHG
AV PEAK GRADIENT: 8 MMHG
AV VALVE AREA: 1.69 CM2
AV VELOCITY RATIO: 0.59
BSA FOR ECHO PROCEDURE: 2.44 M2
CV ECHO LV RWT: 0.49 CM
DOP CALC AO PEAK VEL: 1.38 M/S
DOP CALC AO VTI: 31 CM
DOP CALC LVOT AREA: 3 CM2
DOP CALC LVOT DIAMETER: 1.97 CM
DOP CALC LVOT PEAK VEL: 0.82 M/S
DOP CALC LVOT STROKE VOLUME: 52.4 CM3
DOP CALC RVOT PEAK VEL: 0.68 M/S
DOP CALC RVOT VTI: 17.7 CM
DOP CALCLVOT PEAK VEL VTI: 17.2 CM
E WAVE DECELERATION TIME: 182.24 MSEC
E/A RATIO: 1.48
E/E' RATIO: 9.63 M/S
ECHO LV POSTERIOR WALL: 1.32 CM (ref 0.6–1.1)
EJECTION FRACTION: 60 %
FRACTIONAL SHORTENING: 31 % (ref 28–44)
INTERVENTRICULAR SEPTUM: 1.57 CM (ref 0.6–1.1)
IVRT: 99.9 MSEC
LA MAJOR: 5.35 CM
LA MINOR: 5.27 CM
LA WIDTH: 5.3 CM
LEFT ATRIUM SIZE: 5.21 CM
LEFT ATRIUM VOLUME INDEX MOD: 28.4 ML/M2
LEFT ATRIUM VOLUME INDEX: 54.4 ML/M2
LEFT ATRIUM VOLUME MOD: 65.13 CM3
LEFT ATRIUM VOLUME: 124.62 CM3
LEFT INTERNAL DIMENSION IN SYSTOLE: 3.68 CM (ref 2.1–4)
LEFT VENTRICLE DIASTOLIC VOLUME INDEX: 60.97 ML/M2
LEFT VENTRICLE DIASTOLIC VOLUME: 139.61 ML
LEFT VENTRICLE MASS INDEX: 149 G/M2
LEFT VENTRICLE SYSTOLIC VOLUME INDEX: 25.1 ML/M2
LEFT VENTRICLE SYSTOLIC VOLUME: 57.51 ML
LEFT VENTRICULAR INTERNAL DIMENSION IN DIASTOLE: 5.37 CM (ref 3.5–6)
LEFT VENTRICULAR MASS: 340.64 G
LV LATERAL E/E' RATIO: 8.56 M/S
LV SEPTAL E/E' RATIO: 11 M/S
LVOT MG: 1.98 MMHG
LVOT MV: 0.69 CM/S
MV PEAK A VEL: 0.52 M/S
MV PEAK E VEL: 0.77 M/S
MV STENOSIS PRESSURE HALF TIME: 52.85 MS
MV VALVE AREA P 1/2 METHOD: 4.16 CM2
PISA TR MAX VEL: 2.5 M/S
PULM VEIN S/D RATIO: 1.57
PV MEAN GRADIENT: 1.38 MMHG
PV PEAK D VEL: 0.3 M/S
PV PEAK S VEL: 0.47 M/S
PV PEAK VELOCITY: 0.89 CM/S
RA MAJOR: 4.76 CM
RA WIDTH: 3.74 CM
RIGHT VENTRICULAR END-DIASTOLIC DIMENSION: 2.17 CM
SINUS: 2.14 CM
STJ: 2.56 CM
TDI LATERAL: 0.09 M/S
TDI SEPTAL: 0.07 M/S
TDI: 0.08 M/S
TR MAX PG: 25 MMHG
TRICUSPID ANNULAR PLANE SYSTOLIC EXCURSION: 1.99 CM

## 2022-06-24 PROCEDURE — 93306 ECHO (CUPID ONLY): ICD-10-PCS | Mod: 26,,, | Performed by: INTERNAL MEDICINE

## 2022-06-24 PROCEDURE — 93306 TTE W/DOPPLER COMPLETE: CPT | Mod: 26,,, | Performed by: INTERNAL MEDICINE

## 2022-06-24 PROCEDURE — 93306 TTE W/DOPPLER COMPLETE: CPT | Mod: PO

## 2022-08-09 DIAGNOSIS — I10 HTN (HYPERTENSION), MALIGNANT: ICD-10-CM

## 2022-08-12 RX ORDER — AMLODIPINE BESYLATE 10 MG/1
TABLET ORAL
Qty: 30 TABLET | Refills: 4 | Status: SHIPPED | OUTPATIENT
Start: 2022-08-12 | End: 2023-02-14 | Stop reason: SDUPTHER

## 2022-08-17 ENCOUNTER — TELEPHONE (OUTPATIENT)
Dept: UROLOGY | Facility: CLINIC | Age: 62
End: 2022-08-17
Payer: MEDICARE

## 2022-08-17 NOTE — TELEPHONE ENCOUNTER
appt scheduled for 8/25 @ 1 pm , spoke with patient she is ok with seeing Dr Stovall. Patient expressed understanding.

## 2022-08-17 NOTE — TELEPHONE ENCOUNTER
----- Message from Dorothy Lowe sent at 8/17/2022  4:11 PM CDT -----  Contact: Self  Type:  Sooner Appointment Request    Caller is requesting a sooner appointment.  Caller declined first available appointment listed below.  Caller will not accept being placed on the waitlist and is requesting a message be sent to doctor.    Name of Caller:  Patient  When is the first available appointment?  10/12-scheduled  Symptoms:  recurrent uti  Best Call Back Number:  [phone]  Additional Information:  patient is scheduled for 10/12, but needs to be seen sooner, possible around 8/23-8/31 in the afternoon. Please call pt back to advise. Thank You.

## 2022-09-12 ENCOUNTER — TELEPHONE (OUTPATIENT)
Dept: UROLOGY | Facility: CLINIC | Age: 62
End: 2022-09-12
Payer: MEDICARE

## 2022-09-12 NOTE — TELEPHONE ENCOUNTER
Called and informed pt of med.     ----- Message from Linda Hampton sent at 9/7/2022 12:01 PM CDT -----  Contact: self  Type: Needs Medical Advice  Who Called: patient   Best Call Back Number: [phone]  Additional Information: Pt just wants to know what antibiotic did Dr. Yen give her last time for her UTI. Plz call back and verify with patient.

## 2022-11-03 ENCOUNTER — HOSPITAL ENCOUNTER (OUTPATIENT)
Dept: RADIOLOGY | Facility: HOSPITAL | Age: 62
Discharge: HOME | End: 2022-11-03
Attending: INTERNAL MEDICINE
Payer: MEDICARE

## 2022-11-03 DIAGNOSIS — I71.40 ABDOMINAL AORTIC ANEURYSM (AAA) WITHOUT RUPTURE: ICD-10-CM

## 2022-11-03 PROCEDURE — 76775 US EXAM ABDO BACK WALL LIM: CPT | Mod: 26,,, | Performed by: RADIOLOGY

## 2022-11-03 PROCEDURE — 76775 US EXAM ABDO BACK WALL LIM: CPT | Mod: TC,PO

## 2022-11-03 PROCEDURE — 76775 US ABDOMINAL AORTA: ICD-10-PCS | Mod: 26,,, | Performed by: RADIOLOGY

## 2022-12-30 PROBLEM — Z86.73 HISTORY OF CVA (CEREBROVASCULAR ACCIDENT): Status: ACTIVE | Noted: 2020-02-02

## 2023-05-18 ENCOUNTER — HOSPITAL ENCOUNTER (OUTPATIENT)
Dept: CARDIOLOGY | Facility: HOSPITAL | Age: 63
Discharge: HOME | End: 2023-05-18
Attending: INTERNAL MEDICINE
Payer: MEDICARE

## 2023-05-18 VITALS
WEIGHT: 290 LBS | HEART RATE: 70 BPM | BODY MASS INDEX: 49.51 KG/M2 | DIASTOLIC BLOOD PRESSURE: 74 MMHG | HEIGHT: 64 IN | SYSTOLIC BLOOD PRESSURE: 122 MMHG

## 2023-05-18 DIAGNOSIS — I71.40 ABDOMINAL AORTIC ANEURYSM (AAA) WITHOUT RUPTURE, UNSPECIFIED PART: ICD-10-CM

## 2023-05-18 DIAGNOSIS — Z95.5 STENTED CORONARY ARTERY: ICD-10-CM

## 2023-05-18 DIAGNOSIS — I70.1 ATHEROSCLEROTIC RAS (RENAL ARTERY STENOSIS), BILATERAL: ICD-10-CM

## 2023-05-18 DIAGNOSIS — I34.0 MITRAL VALVE INSUFFICIENCY, UNSPECIFIED ETIOLOGY: ICD-10-CM

## 2023-05-18 DIAGNOSIS — I10 HTN (HYPERTENSION), MALIGNANT: ICD-10-CM

## 2023-05-18 DIAGNOSIS — I25.10 CORONARY ARTERY DISEASE INVOLVING NATIVE CORONARY ARTERY OF NATIVE HEART WITHOUT ANGINA PECTORIS: ICD-10-CM

## 2023-05-18 LAB
AORTIC ROOT ANNULUS: 2.56 CM
ASCENDING AORTA: 3.08 CM
AV INDEX (PROSTH): 0.65
AV MEAN GRADIENT: 7 MMHG
AV PEAK GRADIENT: 14 MMHG
AV VALVE AREA: 1.49 CM2
AV VELOCITY RATIO: 0.63
BSA FOR ECHO PROCEDURE: 2.44 M2
CV ECHO LV RWT: 0.57 CM
DOP CALC AO PEAK VEL: 1.84 M/S
DOP CALC AO VTI: 36 CM
DOP CALC LVOT AREA: 2.3 CM2
DOP CALC LVOT DIAMETER: 1.71 CM
DOP CALC LVOT PEAK VEL: 1.16 M/S
DOP CALC LVOT STROKE VOLUME: 53.48 CM3
DOP CALC RVOT PEAK VEL: 0.6 M/S
DOP CALC RVOT VTI: 15 CM
DOP CALCLVOT PEAK VEL VTI: 23.3 CM
E WAVE DECELERATION TIME: 180.35 MSEC
E/A RATIO: 0.95
E/E' RATIO: 22.2 M/S
ECHO LV POSTERIOR WALL: 1.37 CM (ref 0.6–1.1)
EJECTION FRACTION: 60 %
FRACTIONAL SHORTENING: 40 % (ref 28–44)
INTERVENTRICULAR SEPTUM: 1.71 CM (ref 0.6–1.1)
IVC DIAMETER: 1.12 CM
IVRT: 85.63 MSEC
LA MAJOR: 6 CM
LA MINOR: 5.85 CM
LA WIDTH: 5 CM
LEFT ATRIUM SIZE: 4.95 CM
LEFT ATRIUM VOLUME INDEX MOD: 35.3 ML/M2
LEFT ATRIUM VOLUME INDEX: 54.4 ML/M2
LEFT ATRIUM VOLUME MOD: 80.9 CM3
LEFT ATRIUM VOLUME: 124.63 CM3
LEFT INTERNAL DIMENSION IN SYSTOLE: 2.88 CM (ref 2.1–4)
LEFT VENTRICLE DIASTOLIC VOLUME INDEX: 47.05 ML/M2
LEFT VENTRICLE DIASTOLIC VOLUME: 107.74 ML
LEFT VENTRICLE MASS INDEX: 138 G/M2
LEFT VENTRICLE SYSTOLIC VOLUME INDEX: 13.8 ML/M2
LEFT VENTRICLE SYSTOLIC VOLUME: 31.59 ML
LEFT VENTRICULAR INTERNAL DIMENSION IN DIASTOLE: 4.8 CM (ref 3.5–6)
LEFT VENTRICULAR MASS: 315.69 G
LV LATERAL E/E' RATIO: 22.2 M/S
LV SEPTAL E/E' RATIO: 22.2 M/S
LVOT MG: 2.85 MMHG
LVOT MV: 0.78 CM/S
MV PEAK A VEL: 1.17 M/S
MV PEAK E VEL: 1.11 M/S
MV STENOSIS PRESSURE HALF TIME: 52.3 MS
MV VALVE AREA P 1/2 METHOD: 4.21 CM2
PISA TR MAX VEL: 2.72 M/S
PULM VEIN S/D RATIO: 1.1
PV MEAN GRADIENT: 0.78 MMHG
PV PEAK D VEL: 0.51 M/S
PV PEAK S VEL: 0.56 M/S
PV PEAK VELOCITY: 0.86 CM/S
RA MAJOR: 3.89 CM
RA PRESSURE: 3 MMHG
RIGHT VENTRICULAR END-DIASTOLIC DIMENSION: 2.66 CM
STJ: 2.13 CM
TDI LATERAL: 0.05 M/S
TDI SEPTAL: 0.05 M/S
TDI: 0.05 M/S
TR MAX PG: 30 MMHG
TRICUSPID ANNULAR PLANE SYSTOLIC EXCURSION: 2 CM
TV REST PULMONARY ARTERY PRESSURE: 33 MMHG

## 2023-05-18 PROCEDURE — 93306 TTE W/DOPPLER COMPLETE: CPT | Mod: PO

## 2023-06-14 DIAGNOSIS — K59.00 CONSTIPATION, UNSPECIFIED CONSTIPATION TYPE: ICD-10-CM

## 2023-06-14 RX ORDER — LINACLOTIDE 145 UG/1
CAPSULE, GELATIN COATED ORAL
Qty: 30 CAPSULE | Refills: 0 | Status: SHIPPED | OUTPATIENT
Start: 2023-06-14

## 2023-11-21 ENCOUNTER — HOSPITAL ENCOUNTER (OUTPATIENT)
Dept: RADIOLOGY | Facility: HOSPITAL | Age: 63
Discharge: HOME | End: 2023-11-21
Attending: INTERNAL MEDICINE
Payer: MEDICARE

## 2023-11-21 DIAGNOSIS — I70.1 ATHEROSCLEROSIS OF RENAL ARTERY: ICD-10-CM

## 2023-11-21 DIAGNOSIS — I71.40 ABDOMINAL AORTIC ANEURYSM (AAA) WITHOUT RUPTURE, UNSPECIFIED PART: ICD-10-CM

## 2023-11-21 DIAGNOSIS — I34.0 MITRAL VALVE INSUFFICIENCY, UNSPECIFIED ETIOLOGY: ICD-10-CM

## 2023-11-21 DIAGNOSIS — I71.41 PARARENAL ABDOMINAL AORTIC ANEURYSM (AAA) WITHOUT RUPTURE: ICD-10-CM

## 2023-11-21 DIAGNOSIS — Z95.5 STENTED CORONARY ARTERY: ICD-10-CM

## 2023-11-21 DIAGNOSIS — I25.10 CORONARY ARTERY DISEASE INVOLVING NATIVE CORONARY ARTERY OF NATIVE HEART WITHOUT ANGINA PECTORIS: ICD-10-CM

## 2023-11-21 DIAGNOSIS — I70.1 ATHEROSCLEROTIC RAS (RENAL ARTERY STENOSIS), BILATERAL: ICD-10-CM

## 2023-11-21 PROCEDURE — 76775 US EXAM ABDO BACK WALL LIM: CPT | Mod: 26,,, | Performed by: RADIOLOGY

## 2023-11-21 PROCEDURE — 76775 US EXAM ABDO BACK WALL LIM: CPT | Mod: TC,PO

## 2023-11-21 PROCEDURE — 76775 US ABDOMINAL AORTA: ICD-10-PCS | Mod: 26,,, | Performed by: RADIOLOGY

## 2023-12-16 NOTE — TELEPHONE ENCOUNTER
Spoke to pt.  Colonoscopy rescheduled to 2/8/18    Reminded pt about holding Plavix for 3-5 days prior to the procedure.    BUT if anything changes in her cardiac history between now and then to call so we can get more current approval.    Pt verbalized understanding   (0) Alert; keenly responsive

## 2024-03-18 DIAGNOSIS — I25.10 CORONARY ARTERY DISEASE DUE TO LIPID RICH PLAQUE: Chronic | ICD-10-CM

## 2024-03-18 DIAGNOSIS — I70.1 ATHEROSCLEROTIC RAS (RENAL ARTERY STENOSIS), BILATERAL: Chronic | ICD-10-CM

## 2024-03-18 DIAGNOSIS — Z95.5 STENTED CORONARY ARTERY: Chronic | ICD-10-CM

## 2024-03-18 DIAGNOSIS — I34.0 NON-RHEUMATIC MITRAL REGURGITATION: Chronic | ICD-10-CM

## 2024-03-18 DIAGNOSIS — I10 HTN (HYPERTENSION), MALIGNANT: Chronic | ICD-10-CM

## 2024-03-18 DIAGNOSIS — I71.30 RUPTURED ABDOMINAL AORTIC ANEURYSM (AAA): Chronic | ICD-10-CM

## 2024-03-18 DIAGNOSIS — I25.83 CORONARY ARTERY DISEASE DUE TO LIPID RICH PLAQUE: Chronic | ICD-10-CM

## 2024-03-18 DIAGNOSIS — E78.5 DYSLIPIDEMIA: Chronic | ICD-10-CM

## 2024-03-19 RX ORDER — ROSUVASTATIN CALCIUM 20 MG/1
20 TABLET, COATED ORAL NIGHTLY
Qty: 90 TABLET | Refills: 4 | Status: SHIPPED | OUTPATIENT
Start: 2024-03-19 | End: 2024-04-23 | Stop reason: SDUPTHER

## 2024-04-18 ENCOUNTER — HOSPITAL ENCOUNTER (OUTPATIENT)
Dept: CARDIOLOGY | Facility: HOSPITAL | Age: 64
Discharge: HOME | End: 2024-04-18
Attending: INTERNAL MEDICINE
Payer: MEDICARE

## 2024-04-18 VITALS
SYSTOLIC BLOOD PRESSURE: 132 MMHG | BODY MASS INDEX: 49.51 KG/M2 | DIASTOLIC BLOOD PRESSURE: 87 MMHG | HEART RATE: 72 BPM | HEIGHT: 64 IN | WEIGHT: 290 LBS

## 2024-04-18 DIAGNOSIS — I71.41 PARARENAL ABDOMINAL AORTIC ANEURYSM (AAA) WITHOUT RUPTURE: ICD-10-CM

## 2024-04-18 DIAGNOSIS — Z95.5 STENTED CORONARY ARTERY: ICD-10-CM

## 2024-04-18 DIAGNOSIS — I34.0 MITRAL VALVE INSUFFICIENCY, UNSPECIFIED ETIOLOGY: ICD-10-CM

## 2024-04-18 DIAGNOSIS — I10 HTN (HYPERTENSION), MALIGNANT: ICD-10-CM

## 2024-04-18 DIAGNOSIS — I25.10 CORONARY ARTERY DISEASE INVOLVING NATIVE CORONARY ARTERY OF NATIVE HEART WITHOUT ANGINA PECTORIS: ICD-10-CM

## 2024-04-18 DIAGNOSIS — I71.40 ABDOMINAL AORTIC ANEURYSM (AAA) WITHOUT RUPTURE, UNSPECIFIED PART: ICD-10-CM

## 2024-04-18 DIAGNOSIS — I70.1 ATHEROSCLEROTIC RAS (RENAL ARTERY STENOSIS), BILATERAL: ICD-10-CM

## 2024-04-18 DIAGNOSIS — I70.1 ATHEROSCLEROSIS OF RENAL ARTERY: ICD-10-CM

## 2024-04-18 LAB
AORTIC ROOT ANNULUS: 2.88 CM
ASCENDING AORTA: 2.21 CM
AV INDEX (PROSTH): 0.75
AV MEAN GRADIENT: 8 MMHG
AV PEAK GRADIENT: 16 MMHG
AV VALVE AREA BY VELOCITY RATIO: 1.89 CM²
AV VALVE AREA: 2.06 CM²
AV VELOCITY RATIO: 0.69
BSA FOR ECHO PROCEDURE: 2.44 M2
CV ECHO LV RWT: 0.65 CM
DOP CALC AO PEAK VEL: 2.02 M/S
DOP CALC AO VTI: 38.6 CM
DOP CALC LVOT AREA: 2.7 CM2
DOP CALC LVOT DIAMETER: 1.87 CM
DOP CALC LVOT PEAK VEL: 1.39 M/S
DOP CALC LVOT STROKE VOLUME: 79.33 CM3
DOP CALC RVOT PEAK VEL: 0.91 M/S
DOP CALC RVOT VTI: 17.6 CM
DOP CALCLVOT PEAK VEL VTI: 28.9 CM
E WAVE DECELERATION TIME: 186.71 MSEC
E/A RATIO: 0.74
E/E' RATIO: 24 M/S
ECHO LV POSTERIOR WALL: 1.52 CM (ref 0.6–1.1)
FRACTIONAL SHORTENING: 41 % (ref 28–44)
INTERVENTRICULAR SEPTUM: 2.03 CM (ref 0.6–1.1)
IVC DIAMETER: 0.7 CM
IVRT: 97.05 MSEC
LA MAJOR: 6.35 CM
LA MINOR: 5.96 CM
LA WIDTH: 4.6 CM
LEFT ATRIUM SIZE: 5.03 CM
LEFT ATRIUM VOLUME INDEX MOD: 48.9 ML/M2
LEFT ATRIUM VOLUME INDEX: 52.8 ML/M2
LEFT ATRIUM VOLUME MOD: 111.98 CM3
LEFT ATRIUM VOLUME: 120.93 CM3
LEFT INTERNAL DIMENSION IN SYSTOLE: 2.77 CM (ref 2.1–4)
LEFT VENTRICLE DIASTOLIC VOLUME INDEX: 44.45 ML/M2
LEFT VENTRICLE DIASTOLIC VOLUME: 101.8 ML
LEFT VENTRICLE MASS INDEX: 166 G/M2
LEFT VENTRICLE SYSTOLIC VOLUME INDEX: 12.5 ML/M2
LEFT VENTRICLE SYSTOLIC VOLUME: 28.7 ML
LEFT VENTRICULAR INTERNAL DIMENSION IN DIASTOLE: 4.69 CM (ref 3.5–6)
LEFT VENTRICULAR MASS: 380.25 G
LV LATERAL E/E' RATIO: 21.6 M/S
LV SEPTAL E/E' RATIO: 27 M/S
LVOT MG: 3.62 MMHG
LVOT MV: 0.87 CM/S
MV PEAK A VEL: 1.46 M/S
MV PEAK E VEL: 1.08 M/S
MV STENOSIS PRESSURE HALF TIME: 54.15 MS
MV VALVE AREA P 1/2 METHOD: 4.06 CM2
OHS CV RV/LV RATIO: 0.56 CM
PISA TR MAX VEL: 2.44 M/S
PULM VEIN S/D RATIO: 1.69
PV MEAN GRADIENT: 2 MMHG
PV PEAK D VEL: 0.39 M/S
PV PEAK GRADIENT: 4 MMHG
PV PEAK S VEL: 0.66 M/S
PV PEAK VELOCITY: 1.06 M/S
RA MAJOR: 4.45 CM
RA PRESSURE ESTIMATED: 3 MMHG
RA WIDTH: 3.09 CM
RIGHT VENTRICULAR END-DIASTOLIC DIMENSION: 2.64 CM
RV TB RVSP: 5 MMHG
STJ: 2.39 CM
TDI LATERAL: 0.05 M/S
TDI SEPTAL: 0.04 M/S
TDI: 0.05 M/S
TR MAX PG: 24 MMHG
TRICUSPID ANNULAR PLANE SYSTOLIC EXCURSION: 1.78 CM
TV REST PULMONARY ARTERY PRESSURE: 27 MMHG
Z-SCORE OF LEFT VENTRICULAR DIMENSION IN END DIASTOLE: -6.23
Z-SCORE OF LEFT VENTRICULAR DIMENSION IN END SYSTOLE: -5.1

## 2024-04-18 PROCEDURE — 93306 TTE W/DOPPLER COMPLETE: CPT | Mod: PO

## 2025-06-11 ENCOUNTER — HOSPITAL ENCOUNTER (OUTPATIENT)
Dept: CARDIOLOGY | Facility: HOSPITAL | Age: 65
Discharge: HOME | End: 2025-06-11
Attending: INTERNAL MEDICINE
Payer: MEDICARE

## 2025-06-11 VITALS
BODY MASS INDEX: 45.24 KG/M2 | HEIGHT: 64 IN | DIASTOLIC BLOOD PRESSURE: 87 MMHG | WEIGHT: 265 LBS | SYSTOLIC BLOOD PRESSURE: 132 MMHG | HEART RATE: 75 BPM

## 2025-06-11 PROCEDURE — 93306 TTE W/DOPPLER COMPLETE: CPT | Mod: PO
